# Patient Record
Sex: MALE | Race: WHITE | NOT HISPANIC OR LATINO | Employment: OTHER | URBAN - METROPOLITAN AREA
[De-identification: names, ages, dates, MRNs, and addresses within clinical notes are randomized per-mention and may not be internally consistent; named-entity substitution may affect disease eponyms.]

---

## 2017-01-17 DIAGNOSIS — M25.551 RIGHT HIP PAIN: Primary | ICD-10-CM

## 2017-01-19 ENCOUNTER — TELEPHONE (OUTPATIENT)
Dept: ORTHOPEDICS | Facility: CLINIC | Age: 72
End: 2017-01-19

## 2017-01-19 NOTE — TELEPHONE ENCOUNTER
----- Message from Lexis Sims PA-C sent at 1/17/2017  4:28 PM CST -----  [facility] 2nd floor xray

## 2017-01-19 NOTE — TELEPHONE ENCOUNTER
Patient said he doesn't know when he will be able to get xray, it can be any day within the next 30 days. I asked him if he can give me a date and location I can schedule it and he can go any time. He said he doesn't know, he's headed to [place] right now, he'll have to call back once he picks a day between now and a month from now

## 2017-10-13 ENCOUNTER — HOSPITAL ENCOUNTER (OUTPATIENT)
Dept: RADIOLOGY | Facility: HOSPITAL | Age: 72
Discharge: HOME | End: 2017-10-13
Attending: ORTHOPAEDIC SURGERY
Payer: MEDICARE

## 2017-10-13 DIAGNOSIS — M25.551 RIGHT HIP PAIN: ICD-10-CM

## 2017-10-13 PROCEDURE — 73502 X-RAY EXAM HIP UNI 2-3 VIEWS: CPT | Mod: 26,RT,, | Performed by: RADIOLOGY

## 2017-10-13 PROCEDURE — 73502 X-RAY EXAM HIP UNI 2-3 VIEWS: CPT | Mod: TC,RT

## 2020-09-18 ENCOUNTER — HOSPITAL ENCOUNTER (OUTPATIENT)
Dept: RADIOLOGY | Facility: HOSPITAL | Age: 75
Discharge: HOME | End: 2020-09-18
Attending: INTERNAL MEDICINE
Payer: MEDICARE

## 2020-09-18 DIAGNOSIS — M54.9 BACK PAIN: ICD-10-CM

## 2020-09-18 DIAGNOSIS — M54.9 BACK PAIN: Primary | ICD-10-CM

## 2020-09-18 PROCEDURE — 72110 X-RAY EXAM L-2 SPINE 4/>VWS: CPT | Mod: TC

## 2020-09-18 PROCEDURE — 72050 X-RAY EXAM NECK SPINE 4/5VWS: CPT | Mod: TC

## 2020-09-21 ENCOUNTER — TELEPHONE (OUTPATIENT)
Dept: NEUROSURGERY | Facility: CLINIC | Age: 75
End: 2020-09-21

## 2020-09-21 DIAGNOSIS — M47.816 LUMBAR SPONDYLOSIS: Primary | ICD-10-CM

## 2020-09-21 DIAGNOSIS — M54.2 CERVICAL PAIN (NECK): ICD-10-CM

## 2020-09-21 NOTE — TELEPHONE ENCOUNTER
Spoke with pt. He c/o newly reemerging neck and back problems, has not had any f/u regarding this issue in several years. Appt made with Sheila Loyd PA-C for next week. Pt to get additional XR prior to appt same day. He v/u and is in agreement with plan.

## 2020-09-29 ENCOUNTER — OFFICE VISIT (OUTPATIENT)
Dept: NEUROSURGERY | Facility: CLINIC | Age: 75
End: 2020-09-29
Payer: MEDICARE

## 2020-09-29 ENCOUNTER — TELEPHONE (OUTPATIENT)
Dept: NEUROSURGERY | Facility: CLINIC | Age: 75
End: 2020-09-29

## 2020-09-29 ENCOUNTER — HOSPITAL ENCOUNTER (OUTPATIENT)
Dept: RADIOLOGY | Facility: HOSPITAL | Age: 75
Discharge: HOME | End: 2020-09-29
Attending: PHYSICIAN ASSISTANT
Payer: MEDICARE

## 2020-09-29 VITALS
WEIGHT: 234.38 LBS | HEART RATE: 78 BPM | SYSTOLIC BLOOD PRESSURE: 143 MMHG | HEIGHT: 67 IN | DIASTOLIC BLOOD PRESSURE: 86 MMHG | BODY MASS INDEX: 36.79 KG/M2

## 2020-09-29 DIAGNOSIS — M47.816 LUMBAR SPONDYLOSIS: Primary | ICD-10-CM

## 2020-09-29 DIAGNOSIS — M50.30 DDD (DEGENERATIVE DISC DISEASE), CERVICAL: ICD-10-CM

## 2020-09-29 DIAGNOSIS — R20.0 SADDLE ANESTHESIA: ICD-10-CM

## 2020-09-29 DIAGNOSIS — M54.12 CERVICAL RADICULOPATHY: ICD-10-CM

## 2020-09-29 DIAGNOSIS — Z98.1 S/P LUMBAR FUSION: ICD-10-CM

## 2020-09-29 DIAGNOSIS — Z98.1 HISTORY OF LUMBAR FUSION: Primary | ICD-10-CM

## 2020-09-29 DIAGNOSIS — M54.2 CERVICAL PAIN (NECK): ICD-10-CM

## 2020-09-29 DIAGNOSIS — R20.9 LOSS OF SENSATION OF SADDLE AREA AND BOTH LOWER EXTREMITIES: ICD-10-CM

## 2020-09-29 DIAGNOSIS — Z98.1 HISTORY OF FUSION OF CERVICAL SPINE: ICD-10-CM

## 2020-09-29 DIAGNOSIS — Z98.1 S/P CERVICAL SPINAL FUSION: ICD-10-CM

## 2020-09-29 PROCEDURE — 72040 XR CERVICAL SPINE FLEXION  AND EXTENSION ONLY: ICD-10-PCS | Mod: 26,,, | Performed by: RADIOLOGY

## 2020-09-29 PROCEDURE — 1101F PT FALLS ASSESS-DOCD LE1/YR: CPT | Mod: CPTII,S$GLB,, | Performed by: PHYSICIAN ASSISTANT

## 2020-09-29 PROCEDURE — 99999 PR PBB SHADOW E&M-EST. PATIENT-LVL III: ICD-10-PCS | Mod: PBBFAC,,, | Performed by: PHYSICIAN ASSISTANT

## 2020-09-29 PROCEDURE — 1125F AMNT PAIN NOTED PAIN PRSNT: CPT | Mod: S$GLB,,, | Performed by: PHYSICIAN ASSISTANT

## 2020-09-29 PROCEDURE — 99204 PR OFFICE/OUTPT VISIT, NEW, LEVL IV, 45-59 MIN: ICD-10-PCS | Mod: S$GLB,,, | Performed by: PHYSICIAN ASSISTANT

## 2020-09-29 PROCEDURE — 1101F PR PT FALLS ASSESS DOC 0-1 FALLS W/OUT INJ PAST YR: ICD-10-PCS | Mod: CPTII,S$GLB,, | Performed by: PHYSICIAN ASSISTANT

## 2020-09-29 PROCEDURE — 1125F PR PAIN SEVERITY QUANTIFIED, PAIN PRESENT: ICD-10-PCS | Mod: S$GLB,,, | Performed by: PHYSICIAN ASSISTANT

## 2020-09-29 PROCEDURE — 99999 PR PBB SHADOW E&M-EST. PATIENT-LVL III: CPT | Mod: PBBFAC,,, | Performed by: PHYSICIAN ASSISTANT

## 2020-09-29 PROCEDURE — 99204 OFFICE O/P NEW MOD 45 MIN: CPT | Mod: S$GLB,,, | Performed by: PHYSICIAN ASSISTANT

## 2020-09-29 PROCEDURE — 1159F MED LIST DOCD IN RCRD: CPT | Mod: S$GLB,,, | Performed by: PHYSICIAN ASSISTANT

## 2020-09-29 PROCEDURE — 72040 X-RAY EXAM NECK SPINE 2-3 VW: CPT | Mod: TC

## 2020-09-29 PROCEDURE — 3008F BODY MASS INDEX DOCD: CPT | Mod: CPTII,S$GLB,, | Performed by: PHYSICIAN ASSISTANT

## 2020-09-29 PROCEDURE — 1159F PR MEDICATION LIST DOCUMENTED IN MEDICAL RECORD: ICD-10-PCS | Mod: S$GLB,,, | Performed by: PHYSICIAN ASSISTANT

## 2020-09-29 PROCEDURE — 3008F PR BODY MASS INDEX (BMI) DOCUMENTED: ICD-10-PCS | Mod: CPTII,S$GLB,, | Performed by: PHYSICIAN ASSISTANT

## 2020-09-29 PROCEDURE — 72040 X-RAY EXAM NECK SPINE 2-3 VW: CPT | Mod: 26,,, | Performed by: RADIOLOGY

## 2020-09-29 RX ORDER — HYDROCODONE BITARTRATE AND ACETAMINOPHEN 10; 325 MG/1; MG/1
1 TABLET ORAL
COMMUNITY

## 2020-09-29 RX ORDER — NAPROXEN SODIUM 220 MG/1
81 TABLET, FILM COATED ORAL DAILY
COMMUNITY

## 2020-09-29 NOTE — PROGRESS NOTES
Neurosurgery  History & Physical    SUBJECTIVE:     Chief Complaint: neck and arm pain. Back complications     History of Present Illness:  Patient is a pleasant 75 y/o male who presents to clinic today with multiple complaints. Patient is a previous patient of Dr. Garner's but has not been seen since 2013. He is s/p C4-5 ACDF several years ago and L2-S1 PSIF with Dr. Garner in 2011. He was last seen in 2013 with new complaints of neck pain with radiation into the RUE. CT myelogram showed C5-6 right sided posterior disc-osteophyte complex and EMG showing right chronic C6/C7 radiculopathy. The patient is unclear why he never followed up in our office.     He reports today that he has new groin numbness which started approximately 1 year ago. At first, he reports it was intermittent and he thought it was related to kidney stones he was having at the time, however it is now numb at all times. He reports he is able to urinate on his own and has control over this, but that he feels he cannot fully empty his bladder. He reports full sensation of rectum and reports no issues with bowels. He states he is able to achieve an erection with viagra. He denies loss of sensation otherwise in the extremities. He does also report new pain radiating from lower back into the legs bilaterally, right > left, down the thigh into the calf.     Patient also complains of neck pain that radiates into the BUE, left > right. Pain radiates from shoulder into the biceps and triceps. He reports bilateral hand numbness/tingling as well that is relieved with shaking out his hands. He denies weakness in the upper extremities, gait instability, clumsiness.     Review of patient's allergies indicates:  No Known Allergies    Current Outpatient Medications   Medication Sig Dispense Refill    aspirin 81 MG Chew Take 81 mg by mouth once daily.      gabapentin (NEURONTIN) 600 MG tablet Take 600 mg by mouth 3 (three) times daily.       HYDROcodone-acetaminophen (NORCO)  mg per tablet Take 1 tablet by mouth as needed for Pain.      metoprolol tartrate (LOPRESSOR) 100 MG tablet Take 1 tablet by mouth Daily.      omeprazole (PRILOSEC) 20 MG capsule Take 1 tablet by mouth Daily.      pravastatin (PRAVACHOL) 40 MG tablet Take 1 tablet by mouth Daily.      citalopram (CELEXA) 20 MG tablet Take 1 tablet by mouth Daily.      hydrocodone-acetaminophen (LORTAB)  mg per tablet Take 1 tablet by mouth every 6 (six) hours as needed. (Patient not taking: Reported on 9/29/2020) 120 tablet 0    morphine (MS CONTIN) 15 MG 12 hr tablet Take 1 tablet by mouth Daily.       No current facility-administered medications for this visit.        Past Medical History:   Diagnosis Date    Cerebral aneurysm     GERD (gastroesophageal reflux disease)     Hyperlipidemia     Hypertension      Past Surgical History:   Procedure Laterality Date    CEREBRAL ANEURYSM REPAIR      cervical spinal surgery      CHOLECYSTECTOMY      SPINE SURGERY      x 3 lumbar    TOTAL HIP ARTHROPLASTY      right     Family History     Problem Relation (Age of Onset)    Cancer Brother (45), Sister (60)    Lung cancer Father        Social History     Socioeconomic History    Marital status:      Spouse name: Not on file    Number of children: Not on file    Years of education: Not on file    Highest education level: Not on file   Occupational History    Not on file   Social Needs    Financial resource strain: Not on file    Food insecurity     Worry: Not on file     Inability: Not on file    Transportation needs     Medical: Not on file     Non-medical: Not on file   Tobacco Use    Smoking status: Former Smoker   Substance and Sexual Activity    Alcohol use: No    Drug use: No    Sexual activity: Not on file   Lifestyle    Physical activity     Days per week: Not on file     Minutes per session: Not on file    Stress: Not on file   Relationships    Social  "connections     Talks on phone: Not on file     Gets together: Not on file     Attends [employer] service: Not on file     Active member of club or organization: Not on file     Attends meetings of clubs or organizations: Not on file     Relationship status: Not on file   Other Topics Concern    Not on file   Social History Narrative    Not on file       Review of Systems   Constitutional: Positive for unexpected weight change (weight gain). Negative for fever.   HENT: Positive for tinnitus. Negative for hearing loss, nosebleeds, sore throat, trouble swallowing and voice change.    Eyes: Negative for photophobia and visual disturbance.   Respiratory: Negative for cough, shortness of breath and wheezing.    Cardiovascular: Negative for chest pain and palpitations.   Gastrointestinal: Positive for abdominal pain, constipation, nausea and vomiting. Negative for abdominal distention, blood in stool, diarrhea and rectal pain.   Genitourinary: Positive for difficulty urinating and dysuria. Negative for hematuria.        Difficulty starting or ending a stream  Loss of genital sensation   Able to obtain erection with viagra    Musculoskeletal: Positive for back pain and neck pain. Negative for gait problem.   Neurological: Positive for dizziness, numbness and headaches. Negative for weakness.   Hematological: Bruises/bleeds easily.   Psychiatric/Behavioral: Positive for hallucinations. The patient is nervous/anxious.        OBJECTIVE:     Vital Signs  Pulse: 78  BP: (!) 143/86  Pain Score:   3  Height: 5' 7" (170.2 cm)  Weight: 106.3 kg (234 lb 5.6 oz)  Body mass index is 36.7 kg/m².      Neurosurgery Physical Exam  General: well developed, well nourished, no distress.   Head: normocephalic, atraumatic  Neurologic: Alert and oriented. Thought content appropriate.  GCS: Motor: 6/Verbal: 5/Eyes: 4 GCS Total: 15  Mental Status: Awake, Alert, Oriented x 4  Language: No aphasia  Cranial nerves: face symmetric, tongue midline, " CN II-XII grossly intact.   Eyes: pupils equal, round, reactive to light with accommodation, EOMI.   Pulmonary: normal respirations, no signs of respiratory distress  Skin: Skin is warm, dry and intact.  Sensory: intact to light touch throughout  Motor Strength:Moves all extremities spontaneously with good tone.  Full strength upper and lower extremities. No abnormal movements seen.     Strength  Deltoids Triceps Biceps Wrist Extension Wrist Flexion Hand    Upper: R 5/5 5/5 5/5 5/5 5/5 5/5    L 5/5 5/5 5/5 5/5 5/5 5/5     Iliopsoas Quadriceps Knee  Flexion Tibialis  anterior Gastro- cnemius EHL   Lower: R 5/5 5/5 5/5 5/5 5/5 5/5    L 5/5 5/5 5/5 5/5 5/5 5/5     Reflexes:   DTR: 2+ symmetrically throughout.  Liu's: positive on the left   Clonus: Negative.    Gait stable, fluid.   Tandem Gait: No difficulty  Able to walk on heels & toes     Cervical:   ROM: Full with flexion, extension, lateral rotation and ear-to-shoulder bend.   Midline TTP: Negative.  Lhermitte's: Negative.     Thoracic:  Midline TTP: Negative.     Lumbar:  Midline TTP: Negative.    Other:  SI joint TTP: Negative.  Greater trochanter TTP: Negative.      Diagnostic Results:  I independently reviewed the imaging.     Cervical myelogram 2013 reviewed showing stable fusion at C4-5 with right paracentral posterior disc complex at C5-6    EMG 2013 reviewed showing chronic right C6/7 radiculopathy    XR CERVICAL SPINE FLEXION  AND EXTENSION ONLY  CLINICAL HISTORY:  Cervicalgia  TECHNIQUE:  Flexion and extension views of the cervical spine  COMPARISON:  Flexion and extension views of the cervical spine are noted.  Overall appearance is similar to recent examination with an old fusion of the C4-C5 disc space.  Degenerative changes are evident.  FINDINGS:  See above  Electronically signed by: Ever Hadley MD  Date:                                            09/29/2020  Time:                                           10:42    ASSESSMENT/PLAN:    73 y/o male with history of C4-C5 ACDF and L2-S1 fusion who presents to clinic today for evaluation of BUE numbness/pain/tingling (L>R), groin numbness, and BLE pain (R>L). Patient reports he is now able to have MRI scans.     -Previous (2013) cervical myelogram showing C5-C6 right sided posterior disc complex and EMG showing C6/7 radiculopathy. Patient reports pain has changed since his last visit and now pain radiates from bilateral shoulders to biceps, triceps (L>R), and now has bilateral (L>R) hand numbness/tingling. Patient strong but does have positive jordan's on the left. Recommend MRI cervical spine without contrast to further evaluate.     -Patient now with new onset groin numbness for approximately 1 year and also notes he is not able to fully empty his bladder. Has been worked up for BPH and is on flomax with no improvement. Reports no other sensation abnormality. Also reports new onset BLE (R>L) pain that radiates from lower back down to the calf. Recommend thoracic and lumbar spine MRI without contrast to further evaluate this.     Recommend imaging to be obtained this week given the patient's symptoms. He is in agreement. We will follow up with the patient once images have been obtained to discuss further treatment plan. Likely, this will need to be with Dr. Garner.     I have encouraged him to contact the clinic with any questions, concerns, or adverse clinical changes. He verbalized understanding.     I spent >45 minutes reviewing patient records, examining, and counseling the patient with greater than 50% of the time spent with direct patient care, counseling and coordination.

## 2020-09-29 NOTE — LETTER
September 29, 2020      Jose Hoyt MD  [address]  [address]  [facility] [address]           [facility] [address]  [address]  [facility] [address]  Phone: [phone]  Fax: [phone]          Patient: Michi Agrawal   MR Number: [id number]   YOB: 1945   Date of Visit: 9/29/2020       Dear Dr. Jose Hoyt:    Thank you for referring Michi Agrawal to me for evaluation. Attached you will find relevant portions of my assessment and plan of care.    If you have questions, please do not hesitate to call me. I look forward to following Michi Agrawal along with you.    Sincerely,    TAWNYA Shields  CC:  No Recipients    If you would like to receive this communication electronically, please contact [email or web address] or [phone] to request more information on [employer] access.    For providers and/or their staff who would like to refer a patient to [facility], please contact us through our one-stop-shop provider referral line, [facility], at [phone].    If you feel you have received this communication in error or would no longer like to receive these types of communications, please e-mail [email or web address]

## 2020-10-06 ENCOUNTER — HOSPITAL ENCOUNTER (OUTPATIENT)
Dept: RADIOLOGY | Facility: HOSPITAL | Age: 75
Discharge: HOME | End: 2020-10-06
Attending: PHYSICIAN ASSISTANT
Payer: MEDICARE

## 2020-10-06 DIAGNOSIS — R20.9 LOSS OF SENSATION OF SADDLE AREA AND BOTH LOWER EXTREMITIES: ICD-10-CM

## 2020-10-06 DIAGNOSIS — M54.2 CERVICAL PAIN (NECK): ICD-10-CM

## 2020-10-06 DIAGNOSIS — Z98.1 S/P LUMBAR FUSION: ICD-10-CM

## 2020-10-06 DIAGNOSIS — M50.30 DDD (DEGENERATIVE DISC DISEASE), CERVICAL: ICD-10-CM

## 2020-10-06 DIAGNOSIS — Z98.1 S/P CERVICAL SPINAL FUSION: ICD-10-CM

## 2020-10-06 DIAGNOSIS — M47.816 LUMBAR SPONDYLOSIS: ICD-10-CM

## 2020-10-06 PROCEDURE — 72148 MRI LUMBAR SPINE W/O DYE: CPT | Mod: TC

## 2020-10-06 PROCEDURE — 72146 MRI CHEST SPINE W/O DYE: CPT | Mod: TC

## 2020-10-06 PROCEDURE — 72141 MRI NECK SPINE W/O DYE: CPT | Mod: TC

## 2020-10-07 ENCOUNTER — OFFICE VISIT (OUTPATIENT)
Dept: NEUROSURGERY | Facility: CLINIC | Age: 75
End: 2020-10-07
Payer: MEDICARE

## 2020-10-07 VITALS
DIASTOLIC BLOOD PRESSURE: 87 MMHG | TEMPERATURE: 98 F | WEIGHT: 230.38 LBS | SYSTOLIC BLOOD PRESSURE: 149 MMHG | HEART RATE: 71 BPM | BODY MASS INDEX: 36.08 KG/M2

## 2020-10-07 DIAGNOSIS — Z98.1 S/P LUMBAR FUSION: ICD-10-CM

## 2020-10-07 DIAGNOSIS — M54.2 CERVICAL PAIN (NECK): ICD-10-CM

## 2020-10-07 DIAGNOSIS — Z98.1 S/P CERVICAL SPINAL FUSION: ICD-10-CM

## 2020-10-07 DIAGNOSIS — M47.816 LUMBAR SPONDYLOSIS: Primary | ICD-10-CM

## 2020-10-07 PROCEDURE — 1125F PR PAIN SEVERITY QUANTIFIED, PAIN PRESENT: ICD-10-PCS | Mod: S$GLB,,, | Performed by: NEUROLOGICAL SURGERY

## 2020-10-07 PROCEDURE — 1125F AMNT PAIN NOTED PAIN PRSNT: CPT | Mod: S$GLB,,, | Performed by: NEUROLOGICAL SURGERY

## 2020-10-07 PROCEDURE — 1101F PR PT FALLS ASSESS DOC 0-1 FALLS W/OUT INJ PAST YR: ICD-10-PCS | Mod: CPTII,S$GLB,, | Performed by: NEUROLOGICAL SURGERY

## 2020-10-07 PROCEDURE — 1159F PR MEDICATION LIST DOCUMENTED IN MEDICAL RECORD: ICD-10-PCS | Mod: S$GLB,,, | Performed by: NEUROLOGICAL SURGERY

## 2020-10-07 PROCEDURE — 99999 PR PBB SHADOW E&M-EST. PATIENT-LVL IV: CPT | Mod: PBBFAC,,, | Performed by: NEUROLOGICAL SURGERY

## 2020-10-07 PROCEDURE — 3008F BODY MASS INDEX DOCD: CPT | Mod: CPTII,S$GLB,, | Performed by: NEUROLOGICAL SURGERY

## 2020-10-07 PROCEDURE — 99999 PR PBB SHADOW E&M-EST. PATIENT-LVL IV: ICD-10-PCS | Mod: PBBFAC,,, | Performed by: NEUROLOGICAL SURGERY

## 2020-10-07 PROCEDURE — 99202 OFFICE O/P NEW SF 15 MIN: CPT | Mod: S$GLB,,, | Performed by: NEUROLOGICAL SURGERY

## 2020-10-07 PROCEDURE — 99202 PR OFFICE/OUTPT VISIT, NEW, LEVL II, 15-29 MIN: ICD-10-PCS | Mod: S$GLB,,, | Performed by: NEUROLOGICAL SURGERY

## 2020-10-07 PROCEDURE — 3008F PR BODY MASS INDEX (BMI) DOCUMENTED: ICD-10-PCS | Mod: CPTII,S$GLB,, | Performed by: NEUROLOGICAL SURGERY

## 2020-10-07 PROCEDURE — 1101F PT FALLS ASSESS-DOCD LE1/YR: CPT | Mod: CPTII,S$GLB,, | Performed by: NEUROLOGICAL SURGERY

## 2020-10-07 PROCEDURE — 1159F MED LIST DOCD IN RCRD: CPT | Mod: S$GLB,,, | Performed by: NEUROLOGICAL SURGERY

## 2020-10-07 NOTE — PROGRESS NOTES
Subjective:   I, Bharati Mckeon, attest that this documentation has been prepared under the direction and in the presence of Alirio Garner MD.     Patient ID: Michi Agrawal is a 74 y.o. male     Chief Complaint: No chief complaint on file.          HPI  Mr. Michi Agrawal is a pleasant 74 y.o. gentleman who presents today for follow up with MRI C/T/L spine. Pt was previously under my care for back pain and was last seen by me on 12/11/2013 for new onset neck pain with radiation into the RUE. He is s/p C4-5 ACDF several years ago and L2-S1 PSIF with me in 2011. He had a CT myelogram at that time that showed C5-6 right sided posterior disc-osteophyte complex and EMG showing right chronic C6/C7 radiculopathy. He recently returned and was seen by Sheila Loyd on 09/29/2020 for groin numbness, neck pain, and new low back pain. In regards to the groin numbness, he stated it started approximately 1 year ago, was initially intermittent  in nature and thought to be related to the kidney stones he had at the time, however the numbness is now constant. He is able to urinate on his own with control, but feels he cannot fully empty his bladder. He reported full sensation of rectum and denies issues with bowels.  He is able to achieve an erection with viagra. He denied loss of sensation otherwise in the extremities. Pt also reported new pain radiating from lower back into the legs bilaterally, right > left, down the thigh into the calf.  Pt also complained of neck pain that radiates into the BUE, left > right, from the shoulder into the biceps and triceps.     Pt states pain is the mid lower back, emerges after prolonged sitting (driving in the car), and he also endorses occasional numbness in his leg and foot. He denies leg weakness. He has followed up with Urology and attempted Flomax without improvement.  Pt also complains of difficulty lifting his arms above his head secondary to numbness/tingling that usually occurs at  night.         Review of Systems   Constitutional: Negative for activity change, appetite change, fatigue, fever and unexpected weight change.   HENT: Negative for facial swelling.    Eyes: Negative.    Respiratory: Negative.    Cardiovascular: Negative.    Gastrointestinal: Negative for diarrhea, nausea and vomiting.   Endocrine: Negative.    Genitourinary: Negative.    Musculoskeletal: Positive for back pain (into the BLE) and neck pain. Negative for joint swelling and myalgias.        +right groin and leg pain    Neurological: Positive for numbness (in arms and legs). Negative for dizziness, seizures, weakness and headaches.   Psychiatric/Behavioral: Negative.       Past Medical History:   Diagnosis Date    Cerebral aneurysm     GERD (gastroesophageal reflux disease)     Hyperlipidemia     Hypertension        Objective:      Vitals:    10/07/20 1209   BP: (!) 149/87   Pulse: 71   Temp: 98.2 °F (36.8 °C)      Physical Exam  HENT:      Head: Normocephalic and atraumatic.   Neck:      Musculoskeletal: Neck supple.   Pulmonary:      Breath sounds: Normal breath sounds.   Musculoskeletal:      Comments: No tenderness about the SI joint.   Neurological:      Mental Status: He is alert and oriented to person, place, and time.      GCS: GCS eye subscore is 4. GCS verbal subscore is 5. GCS motor subscore is 6.      Cranial Nerves: No cranial nerve deficit.      Comments: Strength intact with seated hip flexion and leg extension bilaterally.           IMAGING:  MRI Lumbar Spine Without Contrast (10/06/2020) shows a disc bulge with stenosis at L1-2.  There are expected postoperative changes from prior posterior fusion at L2-S1 with solid bone growth throughout.     EMG 01/09/2014  This study is abnormal. There is electrophysiologic evidence for:   1. A mild, right median neuropathy at the wrist (carpal tunnel syndrome);   2. A chronic, right C6 or C7 radiculopathy. No evidence for active denervation was observed.          I have personally reviewed the images with the pt.      I, Dr. Alirio Garner, personally performed the services described in this documentation. All medical record entries made by the scribe, Bharati Mckeon, were at my direction and in my presence.  I have reviewed the chart and agree that the record reflects my personal performance and is accurate and complete. Alirio Garner MD. 10/07/2020    Assessment:       1. Lumbar spondylosis    2. Cervical pain (neck)    3. S/P lumbar fusion    4. S/P cervical spinal fusion         Plan:   I have personally reviewed the MRI lumbar spine with the pt which shows a disc bulge with stenosis at L1-2. There are expected postoperative changes from prior posterior fusion at L2-S1 with solid bone growth throughout. MRI of cervical spine shows a small disc bulge at C5-6 with a lateral component.    Will order CT of the lumbar spine and EMG/NCS of the bilateral upper and lower extremities.  I will schedule the patient for a follow up visit to review results.

## 2020-10-07 NOTE — PATIENT INSTRUCTIONS
I have personally reviewed the MRI lumbar spine with the pt which shows a disc bulge with stenosis at L1-2. There are expected postoperative changes from prior posterior fusion at L2-S1 with solid bone growth throughout. MRI of cervical spine shows a small disc bulge at C5-6 with a lateral component.    Will order CT of the lumbar spine and EMG/NCS of the bilateral upper and lower extremities.  I will schedule the patient for a follow up visit to review results.

## 2020-10-15 ENCOUNTER — TELEPHONE (OUTPATIENT)
Dept: NEUROSURGERY | Facility: CLINIC | Age: 75
End: 2020-10-15

## 2020-10-27 ENCOUNTER — PROCEDURE VISIT (OUTPATIENT)
Dept: PHYSICAL MEDICINE AND REHAB | Facility: CLINIC | Age: 75
End: 2020-10-27
Payer: MEDICARE

## 2020-10-27 DIAGNOSIS — Z98.1 S/P CERVICAL SPINAL FUSION: ICD-10-CM

## 2020-10-27 DIAGNOSIS — M54.2 CERVICAL PAIN (NECK): ICD-10-CM

## 2020-10-27 DIAGNOSIS — M47.816 LUMBAR SPONDYLOSIS: ICD-10-CM

## 2020-10-27 DIAGNOSIS — Z98.1 S/P LUMBAR FUSION: ICD-10-CM

## 2020-10-27 PROCEDURE — 95913 PR NERVE CONDUCTION STUDY; 13 OR MORE STUDIES: ICD-10-PCS | Mod: S$GLB,,, | Performed by: PHYSICAL MEDICINE & REHABILITATION

## 2020-10-27 PROCEDURE — 95913 NRV CNDJ TEST 13/> STUDIES: CPT | Mod: S$GLB,,, | Performed by: PHYSICAL MEDICINE & REHABILITATION

## 2020-10-27 PROCEDURE — 95886 PR EMG COMPLETE, W/ NERVE CONDUCTION STUDIES, 5+ MUSCLES: ICD-10-PCS | Mod: S$GLB,,, | Performed by: PHYSICAL MEDICINE & REHABILITATION

## 2020-10-27 PROCEDURE — 95886 MUSC TEST DONE W/N TEST COMP: CPT | Mod: S$GLB,,, | Performed by: PHYSICAL MEDICINE & REHABILITATION

## 2020-10-27 NOTE — PROCEDURES
Test Date:  10/27/2020    Patient: Michi Agrawal : 1945 Physician: Ashok Landrum D.O.   ID#:  SEX: Male Ref. Phys: Alirio Garner MD     HPI: Michi Agrawal is a 74 y.o.male who presents for NCS/EMG to evaluate bilateral lumbosacral and cervical radiculopathy.  Hx of lumbar decompression X3.      NCV & EMG Findings:   Evaluation of the left median motor and the right median motor nerves showed prolonged distal onset latency.   The left median sensory nerve showed no response.   The right median sensory nerve showed prolonged distal peak latency, reduced amplitude, and decreased conduction velocity.   The left ulnar sensory nerve showed decreased conduction velocity.   All remaining nerves (as indicated in the following tables) were within normal limits.   Needle evaluation of the right Tibialis Posterior muscle showed increased insertional activity, moderately increased spontaneous activity, increased motor unit amplitude, and diminished recruitment.   The right Gastrocnemius, the right Biceps Femoris (Short Hd), and the right Gluteus Ridge muscles showed increased insertional activity and slightly increased spontaneous activity.   The left Tibialis Posterior, the left Gastrocnemius, the left Flexor Digitorum Longus, the right Flexor Digitorum Longus, the left Gluteus Ridge, the right Pronator Teres, the right Cervical Paraspinals (Lower), the left deltoid, the left Pronator Teres, the left Ext Digitorum Communis, and the right Brachioradialis muscles showed increased insertional activity and moderately increased spontaneous activity.   The left Cervical Paraspinals (Lower) muscle showed increased insertional activity and widespread spontaneous activity.   The left Brachioradialis muscle showed increased insertional activity.   The left Abductor Pollicis Brevis muscle showed increased insertional activity, moderately increased spontaneous activity, increased motor unit amplitude,  increased motor unit duration, and diminished recruitment.   All remaining muscles (as indicated in the following table) showed no evidence of electrical instability.    Impression:  1. There is evidence of acute BILATERAL S1 radiculopathies with active/ongoing denervation  2. There is evidence of acute BILATERAL C6 and possibly LEFT C7 radiculopathies with active/ongoing denervation.    3. There is electrophysiologic evidence of a bilateral sensorimotor median mononeuropathy across the wrist (I.e. Carpal tunnel syndrome).  There is no motor axonal loss.  There is active denervation on the left, not on the right.  This is graded as Moderate in severity on the right, and Severe on the Left.        ___________________________  Ashok Landrum D.O.        NCS+  Motor Nerve Results      Latency Amplitude F-Lat Segment Distance CV Comment   Site (ms) Norm (mV) Norm (ms)  (cm) (m/s) Norm    Left Median (APB)   Wrist *6.2  < 4.7 7.5  > 3.8  Wrist-Palm - - -    Elbow 11.3 - 7.6 -  Elbow-Wrist 25 49  > 47    Right Median (APB)   Wrist *5.7  < 4.7 6.8  > 3.8  Wrist-Palm - - -    Elbow 10.4 - 6.7 -  Elbow-Wrist 25 53  > 47    Left Ulnar (ADM)   Wrist 3.5  < 3.7 8.5  > 3.0         Bel Elbow 7.2 - 6.5 -  Bel Elbow-Wrist 25 68  > 52    Abv Elbow 9.8 - 7.4 -  Abv Elbow-Bel Elbow 15 58  > 43    Right Ulnar (ADM)   Wrist 3.4  < 3.7 7.2  > 3.0         Bel Elbow 7.2 - 6.8 -  Bel Elbow-Wrist 25 66  > 52    Abv Elbow 9.6 - 6.0 -  Abv Elbow-Bel Elbow 15 63  > 43    Left Fibular (EDB)   Ankle 4.5  < 6.5 7.6  > 1.10         Bel Fib Head 12.0 - 6.7 -  Bel Fib Head-Ankle 34 45  > 39    Pop Fossa 14.6 - 7.8 -  Pop Fossa-Bel Fib Head 16 62  > 42    Right Fibular (EDB)   Ankle 5.0  < 6.5 5.0  > 1.10         Bel Fib Head 12.4 - 4.1 -  Bel Fib Head-Ankle 34 46  > 39    Pop Fossa 14.9 - 4.0 -  Pop Fossa-Bel Fib Head 14 56  > 42    Left Tibial (AHB)   Ankle 4.0  < 6.1 5.9  > 1.10         Right Tibial (AHB)   Ankle 3.9  < 6.1 5.1  > 1.10            Sensory Nerve Results      Latency (Peak) Amplitude (P-P) Segment Distance CV Comment   Site (ms) Norm (µV) Norm  (cm) (m/s) Norm    Left Median   Wrist-Dig II *NR  < 4.0 *NR  > 8 Wrist-Dig II 14 *NR  > 39    Right Median   Wrist-Dig II *5.9  < 4.0 *8  > 8 Wrist-Dig II 14 *24  > 39    Left Ulnar   Wrist-Dig V 4.0  < 4.0 42  > 4 Wrist-Dig V 14 *35  > 38    Right Ulnar   Wrist-Dig V 3.5  < 4.0 41  > 4 Wrist-Dig V 14 40  > 38    Left Sural   Calf-Lat Mall 3.3  < 4.5 10  > 4 Calf-Lat Mall 14 42  > 35    Right Sural   Calf-Lat Mall 3.6  < 4.5 6  > 4 Calf-Lat Mall 14 39  > 35      EMG+     Side Muscle Nerve Root Ins Act Fibs Psw Amp Dur Poly Recrt Int Pat Comment   Right Vastus Med Femoral L2-L4 Nml Nml Nml Nml Nml 0 Nml Nml    Right Lumbo Parasp (Mid) Rami L3-L4 Nml Nml Nml         Right Tib Anterior Fibular,  Deep Fibula... L4-L5 Nml Nml Nml Nml Nml 0 Nml Nml    Right Tib Posterior Tibial L5-S1 *Incr *2+ *2+ *Incr Nml 0 *Reduced Nml    Right Gastroc Tibial S1-S2 *Incr *1+ *1+ Nml Nml 0 Nml Nml    Right Biceps Fem SH Sciatic L5-S1 *Incr *1+ *1+ Nml Nml 0 Nml Nml    Right Lumbo Parasp (Lower) Rami L5-S1 Nml Nml Nml         Left Vastus Med Femoral L2-L4 Nml Nml Nml Nml Nml 0 Nml Nml    Left Tib Anterior Fibular,  Deep Fibula... L4-L5 Nml Nml Nml Nml Nml 0 Nml Nml    Left Tib Posterior Tibial L5-S1 *Incr *2+ *2+ Nml Nml 0 Nml Nml    Left Gastroc Tibial S1-S2 *Incr *2+ *2+ Nml Nml 0 Nml Nml myotonic discharge   Left Biceps Fem SH Sciatic L5-S1 Nml Nml Nml Nml Nml 0 Nml Nml    Left Lumbo Parasp (Mid) Rami L3-L4 Nml Nml Nml         Left Lumbo Parasp (Lower) Rami L5-S1 Nml Nml Nml         Left FDL Tibial L5-S2 *Incr *2+ *2+ Nml Nml 0 Nml Nml    Right FDL Tibial L5-S2 *Incr *2+ *2+ Nml Nml 0 Nml Nml    Right Gluteus Max Inf Gluteal L5-S2 *Incr Nml *1+ Nml Nml 0 Nml Nml    Right Gluteus Med Sup Gluteal L5-S1 Nml Nml Nml Nml Nml 0 Nml Nml    Left Gluteus Max Inf Gluteal L5-S2 *Incr *2+ *2+ Nml Nml 0 Nml Nml    Right Deltoid  Axillary C5-C6 Nml Nml Nml Nml Nml 0 Nml Nml    Right Triceps Radial C6-C8 Nml Nml Nml Nml Nml 0 Nml Nml    Right Pronator Teres Median C6-C7 *Incr *2+ *2+ Nml Nml 0 Nml Nml    Right EDC Radial,  Posterior In... C7-C8 Nml Nml Nml Nml Nml 0 Nml Nml    Right FDI Ulnar C8-T1 Nml Nml Nml Nml Nml 0 Nml Nml    Right Cervical Parasp (Mid) Rami C4-C6 Nml Nml Nml         Right Cervical Parasp (Lower) Rami C7-C8 *Incr *2+ *2+         Left Deltoid Axillary C5-C6 *Incr *2+ *2+ Nml Nml 0 Nml Nml    Left Triceps Radial C6-C8 Nml Nml Nml Nml Nml 0 Nml Nml    Left Pronator Teres Median C6-C7 *Incr *2+ *2+ Nml Nml 0 Nml Nml    Left EDC Radial,  Posterior In... C7-C8 *Incr *2+ *2+ Nml Nml 0 Nml Nml    Left FDI Ulnar C8-T1 Nml Nml Nml Nml Nml 0 Nml Nml    Left Cervical Parasp (Mid) Rami C4-C6 Nml Nml Nml         Left Cervical Parasp (Lower) Rami C7-C8 *Incr *4+ *4+         Right Brachiorad Radial C5-C6 *Incr *2+ *2+ Nml Nml 0 Nml Nml    Right Biceps Musculocut C5-C6 Nml Nml Nml Nml Nml 0 Nml Nml    Right APB Median C8-T1 Nml Nml Nml Nml Nml 0 Nml Nml    Left Brachiorad Radial C5-C6 *Incr Nml Nml Nml Nml 0 Nml Nml doublet   Left APB Median C8-T1 *Incr *2+ *2+ *Incr *>12ms 0 *Reduced Nml            Waveforms:    Motor                         Sensory

## 2020-10-28 ENCOUNTER — HOSPITAL ENCOUNTER (OUTPATIENT)
Dept: RADIOLOGY | Facility: HOSPITAL | Age: 75
Discharge: HOME | End: 2020-10-28
Attending: NEUROLOGICAL SURGERY
Payer: MEDICARE

## 2020-10-28 ENCOUNTER — TELEPHONE (OUTPATIENT)
Dept: NEUROSURGERY | Facility: CLINIC | Age: 75
End: 2020-10-28

## 2020-10-28 ENCOUNTER — OFFICE VISIT (OUTPATIENT)
Dept: NEUROSURGERY | Facility: CLINIC | Age: 75
End: 2020-10-28
Payer: MEDICARE

## 2020-10-28 VITALS
WEIGHT: 222.69 LBS | BODY MASS INDEX: 34.87 KG/M2 | DIASTOLIC BLOOD PRESSURE: 72 MMHG | SYSTOLIC BLOOD PRESSURE: 136 MMHG | HEART RATE: 48 BPM

## 2020-10-28 DIAGNOSIS — M47.816 LUMBAR SPONDYLOSIS: ICD-10-CM

## 2020-10-28 DIAGNOSIS — Z98.1 S/P LUMBAR FUSION: ICD-10-CM

## 2020-10-28 DIAGNOSIS — Z98.1 HISTORY OF LUMBAR FUSION: ICD-10-CM

## 2020-10-28 DIAGNOSIS — M47.816 LUMBAR SPONDYLOSIS: Primary | ICD-10-CM

## 2020-10-28 DIAGNOSIS — M54.2 CERVICAL PAIN (NECK): ICD-10-CM

## 2020-10-28 DIAGNOSIS — M47.22 CERVICAL SPONDYLOSIS WITH RADICULOPATHY: ICD-10-CM

## 2020-10-28 DIAGNOSIS — R20.0 SADDLE ANESTHESIA: ICD-10-CM

## 2020-10-28 PROCEDURE — 99999 PR PBB SHADOW E&M-EST. PATIENT-LVL IV: CPT | Mod: PBBFAC,,, | Performed by: NEUROLOGICAL SURGERY

## 2020-10-28 PROCEDURE — 99214 PR OFFICE/OUTPT VISIT, EST, LEVL IV, 30-39 MIN: ICD-10-PCS | Mod: S$GLB,,, | Performed by: NEUROLOGICAL SURGERY

## 2020-10-28 PROCEDURE — 1159F PR MEDICATION LIST DOCUMENTED IN MEDICAL RECORD: ICD-10-PCS | Mod: S$GLB,,, | Performed by: NEUROLOGICAL SURGERY

## 2020-10-28 PROCEDURE — 3008F BODY MASS INDEX DOCD: CPT | Mod: CPTII,S$GLB,, | Performed by: NEUROLOGICAL SURGERY

## 2020-10-28 PROCEDURE — 1101F PT FALLS ASSESS-DOCD LE1/YR: CPT | Mod: CPTII,S$GLB,, | Performed by: NEUROLOGICAL SURGERY

## 2020-10-28 PROCEDURE — 1159F MED LIST DOCD IN RCRD: CPT | Mod: S$GLB,,, | Performed by: NEUROLOGICAL SURGERY

## 2020-10-28 PROCEDURE — 1125F AMNT PAIN NOTED PAIN PRSNT: CPT | Mod: S$GLB,,, | Performed by: NEUROLOGICAL SURGERY

## 2020-10-28 PROCEDURE — 99999 PR PBB SHADOW E&M-EST. PATIENT-LVL IV: ICD-10-PCS | Mod: PBBFAC,,, | Performed by: NEUROLOGICAL SURGERY

## 2020-10-28 PROCEDURE — 3008F PR BODY MASS INDEX (BMI) DOCUMENTED: ICD-10-PCS | Mod: CPTII,S$GLB,, | Performed by: NEUROLOGICAL SURGERY

## 2020-10-28 PROCEDURE — 1101F PR PT FALLS ASSESS DOC 0-1 FALLS W/OUT INJ PAST YR: ICD-10-PCS | Mod: CPTII,S$GLB,, | Performed by: NEUROLOGICAL SURGERY

## 2020-10-28 PROCEDURE — 99214 OFFICE O/P EST MOD 30 MIN: CPT | Mod: S$GLB,,, | Performed by: NEUROLOGICAL SURGERY

## 2020-10-28 PROCEDURE — 72131 CT LUMBAR SPINE WITHOUT CONTRAST: ICD-10-PCS | Mod: 26,,, | Performed by: RADIOLOGY

## 2020-10-28 PROCEDURE — 72131 CT LUMBAR SPINE W/O DYE: CPT | Mod: 26,,, | Performed by: RADIOLOGY

## 2020-10-28 PROCEDURE — 72131 CT LUMBAR SPINE W/O DYE: CPT | Mod: TC

## 2020-10-28 PROCEDURE — 1125F PR PAIN SEVERITY QUANTIFIED, PAIN PRESENT: ICD-10-PCS | Mod: S$GLB,,, | Performed by: NEUROLOGICAL SURGERY

## 2020-10-28 NOTE — PROGRESS NOTES
Subjective:   I, Vilma Keating, attest that this documentation has been prepared under the direction and in the presence of Alirio Garner MD.     Patient ID: Michi Agrawal is a 74 y.o. male     Chief Complaint: No chief complaint on file.          HPI  Mr. Michi Agrawal is a pleasant 74 y.o. gentleman with a  PMHx of cerebral aneurysm, HLD, and HTN who presents to the clinic for follow-up with new imaging. He was last seen by me on 10/07/2020 for mid lower back, that emerged after prolonged sitting (driving in the car), and he also endorsed occasional numbness in his leg and foot. He denied leg weakness.Pt also complained of difficulty lifting his arms above his head secondary to numbness/tingling that usually occurs at night. He previousley had neck pain with radiation into the RUE. He is s/p C4-5 ACDF several years ago and L2-S1 PSIF with me in 2011. He had a CT myelogram at that time that showed C5-6 right sided posterior disc-osteophyte complex and EMG showing right chronic C6/C7 radiculopathy. He recently returned and was seen by Sheila Loyd on 09/29/2020 for groin numbness, neck pain, and new low back pain.    His primary complaint is the radiating leg pain. He began taking Gabapentin again roughly 8 months ago. He denies pain looking up or down. He typically finds raising his arms alleviates his neck pain. He has had injections in the past and found them to be ineffective. He is concerned about his bladder symptoms. He has found Flomax to be ineffective.      Review of Systems   Constitutional: Negative for activity change, appetite change, fatigue, fever and unexpected weight change.   HENT: Negative for facial swelling.    Eyes: Negative.    Respiratory: Negative.    Cardiovascular: Negative.    Gastrointestinal: Negative for diarrhea, nausea and vomiting.   Endocrine: Negative.    Genitourinary: Negative.    Musculoskeletal: Positive for myalgias and neck pain. Negative for back pain and joint  swelling.   Neurological: Positive for numbness. Negative for dizziness, seizures, weakness and headaches.   Psychiatric/Behavioral: Negative.       Past Medical History:   Diagnosis Date    Cerebral aneurysm     GERD (gastroesophageal reflux disease)     Hyperlipidemia     Hypertension        Objective:    There were no vitals filed for this visit.   Physical Exam       IMAGING:    EMG (10/27/2020):    1. There is evidence of acute BILATERAL S1 radiculopathies with   active/ongoing denervation   2. There is evidence of acute BILATERAL C6 and possibly LEFT C7   radiculopathies with active/ongoing denervation.     3. There is electrophysiologic evidence of a bilateral   sensorimotor median mononeuropathy across the wrist (I.e. Carpal   tunnel syndrome).  There is no motor axonal loss.  There is   active denervation on the left, not on the right.  This is graded   as Moderate in severity on the right, and Severe on the Left.       CT L-Spine (10/28/2020):  1.Shows solid bony fusion from L2-S1  2. S1 radiculopathies  3. Stenosis at S1 level    MRI C-Spine (10/6/2020):  1. Foraminal stenosis on the left at C6-7    I have personally reviewed the images with the pt.      I, Dr. Alirio Garner, personally performed the services described in this documentation. All medical record entries made by the scribe, Vilma Keating, were at my direction and in my presence.  I have reviewed the chart and agree that the record reflects my personal performance and is accurate and complete. Alirio Garner MD. 10/28/2020    Assessment:       1. Lumbosacral radiculopathy.  2. Bilateral C6 radiculopathy.  3. S/p lumbar fusion.  Plan:   I have personally reviewed the EMG, MRI C-Spine, and CT L-Spine with the pt which shows bilateral  S1 radiculopathies. Additionally the EMG showed evidence of bilateral C6 radiculopathies and carpal tunnel syndrome.    I recommend a transforaminal injection at S1.    I will schedule the patient with pain  management, Flex-ex Xray C-spine and L-Spine, and a follow-up in 3 months.

## 2020-10-28 NOTE — PATIENT INSTRUCTIONS
I have personally reviewed the EMG, MRI C-Spine, and CT L-Spine with the pt which shows bilateral  S1 radiculopathies. Additionally the EMG showed evidence of bilateral C6 radiculopathies and carpal tunnel syndrome.    I recommend a transforaminal injection at S1.    I will schedule the patient with pain management, Flex-ex Xray C-spine and L-spine, and a follow-up in 3 months.

## 2020-10-30 ENCOUNTER — TELEPHONE (OUTPATIENT)
Dept: NEUROSURGERY | Facility: CLINIC | Age: 75
End: 2020-10-30

## 2020-10-30 NOTE — TELEPHONE ENCOUNTER
Spoke with pt. He is already established with [facility] on the [facility], will be contacting them soon about arranging f/u and injections. Requested pt contact back if he has any difficulty setting up appts. Will make 3 month f/u appt and send slips in the mail.

## 2020-11-02 ENCOUNTER — HOSPITAL ENCOUNTER (OUTPATIENT)
Dept: RADIOLOGY | Facility: OTHER | Age: 75
Discharge: HOME | End: 2020-11-02
Attending: NEUROLOGICAL SURGERY
Payer: MEDICARE

## 2020-11-02 ENCOUNTER — OFFICE VISIT (OUTPATIENT)
Dept: PAIN MEDICINE | Facility: CLINIC | Age: 75
End: 2020-11-02
Attending: NEUROLOGICAL SURGERY
Payer: MEDICARE

## 2020-11-02 VITALS
DIASTOLIC BLOOD PRESSURE: 84 MMHG | TEMPERATURE: 97 F | OXYGEN SATURATION: 100 % | RESPIRATION RATE: 18 BRPM | SYSTOLIC BLOOD PRESSURE: 128 MMHG | BODY MASS INDEX: 35.98 KG/M2 | HEART RATE: 72 BPM | WEIGHT: 229.25 LBS | HEIGHT: 67 IN

## 2020-11-02 DIAGNOSIS — M47.816 LUMBAR SPONDYLOSIS: ICD-10-CM

## 2020-11-02 DIAGNOSIS — M96.1 POST LAMINECTOMY SYNDROME: Primary | ICD-10-CM

## 2020-11-02 DIAGNOSIS — M54.12 CERVICAL RADICULOPATHY: ICD-10-CM

## 2020-11-02 DIAGNOSIS — Z98.1 S/P LUMBAR FUSION: ICD-10-CM

## 2020-11-02 DIAGNOSIS — M54.2 CERVICAL PAIN (NECK): ICD-10-CM

## 2020-11-02 DIAGNOSIS — G89.4 CHRONIC PAIN DISORDER: ICD-10-CM

## 2020-11-02 DIAGNOSIS — M47.22 CERVICAL SPONDYLOSIS WITH RADICULOPATHY: ICD-10-CM

## 2020-11-02 DIAGNOSIS — M54.16 LUMBAR RADICULOPATHY: ICD-10-CM

## 2020-11-02 PROCEDURE — 3008F BODY MASS INDEX DOCD: CPT | Mod: CPTII,S$GLB,, | Performed by: ANESTHESIOLOGY

## 2020-11-02 PROCEDURE — 72050 X-RAY EXAM NECK SPINE 4/5VWS: CPT | Mod: 26,,, | Performed by: RADIOLOGY

## 2020-11-02 PROCEDURE — 1101F PT FALLS ASSESS-DOCD LE1/YR: CPT | Mod: CPTII,S$GLB,, | Performed by: ANESTHESIOLOGY

## 2020-11-02 PROCEDURE — 1101F PR PT FALLS ASSESS DOC 0-1 FALLS W/OUT INJ PAST YR: ICD-10-PCS | Mod: CPTII,S$GLB,, | Performed by: ANESTHESIOLOGY

## 2020-11-02 PROCEDURE — 3008F PR BODY MASS INDEX (BMI) DOCUMENTED: ICD-10-PCS | Mod: CPTII,S$GLB,, | Performed by: ANESTHESIOLOGY

## 2020-11-02 PROCEDURE — 1159F PR MEDICATION LIST DOCUMENTED IN MEDICAL RECORD: ICD-10-PCS | Mod: S$GLB,,, | Performed by: ANESTHESIOLOGY

## 2020-11-02 PROCEDURE — 72120 X-RAY BEND ONLY L-S SPINE: CPT | Mod: TC,FY

## 2020-11-02 PROCEDURE — 99999 PR PBB SHADOW E&M-EST. PATIENT-LVL IV: CPT | Mod: PBBFAC,,, | Performed by: ANESTHESIOLOGY

## 2020-11-02 PROCEDURE — 99204 PR OFFICE/OUTPT VISIT, NEW, LEVL IV, 45-59 MIN: ICD-10-PCS | Mod: S$GLB,,, | Performed by: ANESTHESIOLOGY

## 2020-11-02 PROCEDURE — 72050 XR CERVICAL SPINE AP LAT WITH FLEX EXTEN: ICD-10-PCS | Mod: 26,,, | Performed by: RADIOLOGY

## 2020-11-02 PROCEDURE — 1125F PR PAIN SEVERITY QUANTIFIED, PAIN PRESENT: ICD-10-PCS | Mod: S$GLB,,, | Performed by: ANESTHESIOLOGY

## 2020-11-02 PROCEDURE — 1159F MED LIST DOCD IN RCRD: CPT | Mod: S$GLB,,, | Performed by: ANESTHESIOLOGY

## 2020-11-02 PROCEDURE — 1125F AMNT PAIN NOTED PAIN PRSNT: CPT | Mod: S$GLB,,, | Performed by: ANESTHESIOLOGY

## 2020-11-02 PROCEDURE — 72050 X-RAY EXAM NECK SPINE 4/5VWS: CPT | Mod: TC,FY

## 2020-11-02 PROCEDURE — 72120 XR LUMBAR SPINE FLEXION AND EXTENSION ONLY: ICD-10-PCS | Mod: 26,,, | Performed by: RADIOLOGY

## 2020-11-02 PROCEDURE — 99204 OFFICE O/P NEW MOD 45 MIN: CPT | Mod: S$GLB,,, | Performed by: ANESTHESIOLOGY

## 2020-11-02 PROCEDURE — 99999 PR PBB SHADOW E&M-EST. PATIENT-LVL IV: ICD-10-PCS | Mod: PBBFAC,,, | Performed by: ANESTHESIOLOGY

## 2020-11-02 PROCEDURE — 72120 X-RAY BEND ONLY L-S SPINE: CPT | Mod: 26,,, | Performed by: RADIOLOGY

## 2020-11-02 NOTE — PROGRESS NOTES
Chronic Pain - New Consult    Referring Physician: Alirio Garner MD    Chief Complaint:   Chief Complaint   Patient presents with    Low-back Pain    Neck Pain        SUBJECTIVE:    Michi Agrawal presents to the clinic for the evaluation of neck and lower back pain. The pain started several years ago and symptoms have been worsening. He is s/p C4-C5 ACDF (1995), L2-S1 PSIF (2011), and right hip replacement. The pain is located in the cervical and sacral area and radiates to bilateral arms and legs.  The pain is described as numbing and is rated as 5/10. The pain is rated with a score of  3/10 on the BEST day and a score of 6/10 on the WORST day.  Symptoms interfere with sleeping. The pain is exacerbated by Sitting, Walking and Night Time.  The pain is mitigated by rest, lifting his arms, and pain medication. Patient also complains of some numbness and tingling in his penis. No urinary incontinence present.     Patient denies night fever/night sweats, urinary incontinence, bowel incontinence, significant weight loss and significant motor weakness.    Physical Therapy/Home Exercise: no      Pain Disability Index Review:  No flowsheet data found.    Pain Medications:    - Opioids: Norco  mg 3-4 times a day   -Neurontin 800 mg BID     report:  Reviewed and consistent with medication use as prescribed.    Pain Procedures:   Reports prior steroid injections with some relief  Also reports prior spinal cord stimulator trial with no relief of pain    Imaging:     EMG with ultrasound and nerve conduction (10/27/20):  Impression:   1. There is evidence of acute BILATERAL S1 radiculopathies with active/ongoing denervation   2. There is evidence of acute BILATERAL C6 and possibly LEFT C7 radiculopathies with active/ongoing denervation.     3. There is electrophysiologic evidence of a bilateral sensorimotor median mononeuropathy across the wrist (I.e. Carpal tunnel syndrome).  There is no motor axonal loss.  There  is active denervation on the left, not on the right.  This is graded as Moderate in severity on the right, and Severe on the Left.    Xray cervical spine (10/28)  Impression:  No evidence for acute fracture, bone destruction, or subluxation.  No instability elicited on the flexion and extension radiographs.  Cervical spondylosis    Xray lumbar spine (10/28)  Impression:  Postoperative changes of spinal fusion extending from the L2 level to the S1 level.  No evidence for hardware failure.  No motion on the flexion and extension radiographs.  Degenerative changes at the L1-L2 level.  No instability elicited at the L1-L2 level.     Atherosclerosis.  CT lumbar spine (10/7/20):  Impression:  2. Postoperative change of spinal and osseous fusion, medical screws, and intervertebral disc spacers as detailed above.  2. Lucency surrounding both S1 pedicle screws, potentially representing mild loosening.  3. Multilevel foraminal narrowing and central canal stenosis as detailed above.    MRI lumbar spine  (9/29/20):  Impression:  Postoperative changes of posterior fusion from L2-S1.  Mild-to-moderate multilevel foraminal narrowing secondary to facet arthrosis.  L1-L2 disc bulge which combines with posterior ligamentous hypertrophy and facet joint hypertrophic change to produce moderate central canal stenosis and mild to moderate bilateral foraminal narrowing.    MRI cervical spine (9/29/20)  Impression:  Osseous fusion of the C4 and C5 bodies, unchanged from the previous exam.  Mild multilevel disc bulges, most significant at C5-C6 where the disc bulge appears to produce mild ventral spinal cord compression.  Please see the above report for level by level detail.        Past Medical History:   Diagnosis Date    Cerebral aneurysm     GERD (gastroesophageal reflux disease)     Hyperlipidemia     Hypertension      Past Surgical History:   Procedure Laterality Date    CEREBRAL ANEURYSM REPAIR      cervical spinal surgery       CHOLECYSTECTOMY      SPINE SURGERY      x 3 lumbar    TOTAL HIP ARTHROPLASTY      right     Social History     Socioeconomic History    Marital status:      Spouse name: Not on file    Number of children: Not on file    Years of education: Not on file    Highest education level: Not on file   Occupational History    Not on file   Social Needs    Financial resource strain: Not on file    Food insecurity     Worry: Not on file     Inability: Not on file    Transportation needs     Medical: Not on file     Non-medical: Not on file   Tobacco Use    Smoking status: Former Smoker   Substance and Sexual Activity    Alcohol use: No    Drug use: No    Sexual activity: Not on file   Lifestyle    Physical activity     Days per week: Not on file     Minutes per session: Not on file    Stress: Not on file   Relationships    Social connections     Talks on phone: Not on file     Gets together: Not on file     Attends Hinduism service: Not on file     Active member of club or organization: Not on file     Attends meetings of clubs or organizations: Not on file     Relationship status: Not on file   Other Topics Concern    Not on file   Social History Narrative    Not on file     Family History   Problem Relation Age of Onset    Lung cancer Father     Cancer Brother 45        agent orange    Cancer Sister 60        intestinal cancer       Review of patient's allergies indicates:  No Known Allergies    Current Outpatient Medications   Medication Sig    aspirin 81 MG Chew Take 81 mg by mouth once daily.    citalopram (CELEXA) 20 MG tablet Take 1 tablet by mouth Daily.    gabapentin (NEURONTIN) 600 MG tablet Take 600 mg by mouth 3 (three) times daily.    HYDROcodone-acetaminophen (NORCO)  mg per tablet Take 1 tablet by mouth as needed for Pain.    metoprolol tartrate (LOPRESSOR) 100 MG tablet Take 1 tablet by mouth Daily.    morphine (MS CONTIN) 15 MG 12 hr tablet Take 1 tablet by mouth Daily.  "   omeprazole (PRILOSEC) 20 MG capsule Take 1 tablet by mouth Daily.    pravastatin (PRAVACHOL) 40 MG tablet Take 1 tablet by mouth Daily.    hydrocodone-acetaminophen (LORTAB)  mg per tablet Take 1 tablet by mouth every 6 (six) hours as needed. (Patient not taking: Reported on 9/29/2020)     No current facility-administered medications for this visit.        REVIEW OF SYSTEMS:    GENERAL:  No weight loss, malaise or fevers.  HEENT:  Negative for frequent or significant headaches.  NECK:  Negative for lumps, goiter and significant neck swelling. +neck pain  RESPIRATORY:  Negative for cough, wheezing or shortness of breath.  CARDIOVASCULAR:  Negative for chest pain, leg swelling or palpitations.  GI:  GERD Negative for abdominal discomfort, blood in stools or black stools or change in bowel habits.  MUSCULOSKELETAL:  See HPI.  SKIN:  Negative for lesions, rash, and itching.  PSYCH:  Negative for mood disorder and recent psychosocial stressors. +sleep disturbance  HEMATOLOGY/LYMPHOLOGY:  Negative for prolonged bleeding, bruising easily or swollen nodes.  NEURO:   No history of headaches, syncope, paralysis, seizures or tremors.  All other reviewed and negative other than HPI.    OBJECTIVE:    /84   Pulse 72   Temp 97.4 °F (36.3 °C)   Resp 18   Ht 5' 7" (1.702 m)   Wt 104 kg (229 lb 4.5 oz)   SpO2 100%   BMI 35.91 kg/m²     PHYSICAL EXAMINATION:    General appearance: Well appearing, in no acute distress, alert and oriented x3.  Psych:  Mood and affect appropriate.  Skin: Skin color, texture, turgor normal, no rashes or lesions, in both upper and lower body.  Head/face:  Normocephalic, atraumatic. No palpable lymph nodes.  Neck: Positive pain to palpation over the cervical paraspinous muscles. Spurling Negative. Limited ROM of cervical spine.  Cor: RRR  Pulm: CTA  GI:  Soft and non-tender.  Back: Straight leg raising in the sitting and supine positions is negative to radicular pain.  Tenderness to " palpation over bilateral lumbar paraspinal muscles.  Limited range of motion of lumbar spine.  Extremities: Peripheral joint ROM is full and pain free without obvious instability or laxity in all four extremities. No deformities, edema, or skin discoloration. Good capillary refill.  Musculoskeletal: Shoulder, hip, sacroiliac and knee provocative maneuvers are negative. Bilateral upper and lower extremity strength is normal and symmetric.  No atrophy or tone abnormalities are noted. Negative straight leg test. Right leg extension less than left leg extension.   Neuro: Bilateral upper and lower extremity coordination and muscle stretch reflexes are physiologic and symmetric.  Plantar response are downgoing. No loss of sensation is noted.  Gait:  Antalgic.    ASSESSMENT: 74 y.o. year old male with bilateral S1 radiculopathy and bilateral C6 radiculopathy, consistent with Post laminectomy syndrome.    1. Post laminectomy syndrome     2. Lumbar spondylosis  Ambulatory referral/consult to Pain Clinic   3. Cervical pain (neck)  Ambulatory referral/consult to Pain Clinic   4. S/P lumbar fusion  Ambulatory referral/consult to Pain Clinic   5. Chronic pain disorder     6. Cervical radiculopathy     7. Lumbar radiculopathy           PLAN:     - Patient consented and scheduled for S1 TFESI with possibility of caudal steroid injection. Also consented and scheduled for C7-T1 ALISA one week after.  - I have stressed the importance of physical activity and a home exercise plan to help with pain and improve health.  - Patient can continue with medications for now since they are providing benefits, using them appropriately, and without side effects.  - RTC 2 weeks after procedure.  - Counseled patient regarding the importance of activity modification and constant sleeping habits.    The above plan and management options were discussed at length with patient. Patient is in agreement with the above and verbalized understanding. It will  be communicated with the referring physician via electronic record, fax, or mail.    Lindsey Grover  11/02/2020     I have reviewed and concur with the resident's history, physical, assessment, and plan.  I have personally interviewed and examined the patient at bedside.  See below addendum for my evaluation and additional findings.  74-year-old male with chronic neck and lower back pain consistent with cervical post laminectomy syndrome and lumbar post-laminectomy syndrome.  We will schedule the patient for bilateral S1 transforaminal epidural steroid injection versus caudal epidural steroid injection.  We will also schedule him for cervical ALISA at C7/T1 level 1 week after.  Will follow up with him 2 weeks after procedure.    Rustam Donahue MD

## 2020-11-02 NOTE — LETTER
November 2, 2020      Alirio Garner MD  [address]  [facility] [address]           [facility]  [address]  [facility] [address]  Phone: [phone]  Fax: [phone]          Patient: Michi Agrawal   MR Number: [id number]   YOB: 1945   Date of Visit: 11/2/2020       Dear Dr. Alirio Garner:    Thank you for referring Michi Agrawal to me for evaluation. Attached you will find relevant portions of my assessment and plan of care.    If you have questions, please do not hesitate to call me. I look forward to following Michi Agrawal along with you.    Sincerely,    Rustam Donahue MD    Enclosure  CC:  No Recipients    If you would like to receive this communication electronically, please contact [email or web address] or [phone] to request more information on [email or web address] Link access.    For providers and/or their staff who would like to refer a patient to [facility], please contact us through our one-stop-shop provider referral line, [facility], at [phone].    If you feel you have received this communication in error or would no longer like to receive these types of communications, please e-mail [email or web address]

## 2020-11-02 NOTE — PROGRESS NOTES
Chronic Pain - New Consult    Referring Physician: Alirio Garner MD    Chief Complaint:   Chief Complaint   Patient presents with    Low-back Pain    Neck Pain        SUBJECTIVE:    Michi Agrawal presents to the clinic for the evaluation of neck and lower back pain. The pain started several years ago and symptoms have been worsening. He is s/p C4-C5 ACDF (1995), L2-S1 PSIF (2011), and right hip replacement. The pain is located in the cervical and sacral area and radiates to bilateral arms and legs.  The pain is described as numbing and is rated as 5/10. The pain is rated with a score of  3/10 on the BEST day and a score of 6/10 on the WORST day.  Symptoms interfere with sleeping. The pain is exacerbated by Sitting, Walking and Night Time.  The pain is mitigated by rest, lifting his arms, and pain medication. Patient also complains of some numbness and tingling in his penis. No urinary incontinence present.     Patient denies night fever/night sweats, urinary incontinence, bowel incontinence, significant weight loss and significant motor weakness.    Physical Therapy/Home Exercise: no      Pain Disability Index Review:  No flowsheet data found.    Pain Medications:    - Opioids: Norco  mg 3-4 times a day   -Neurontin 800 mg BID     report:  Reviewed and consistent with medication use as prescribed.    Pain Procedures:   Reports prior steroid injections with some relief  Also reports prior spinal cord stimulator trial with no relief of pain    Imaging:     EMG with ultrasound and nerve conduction (10/27/20):  Impression:   1. There is evidence of acute BILATERAL S1 radiculopathies with active/ongoing denervation   2. There is evidence of acute BILATERAL C6 and possibly LEFT C7 radiculopathies with active/ongoing denervation.     3. There is electrophysiologic evidence of a bilateral sensorimotor median mononeuropathy across the wrist (I.e. Carpal tunnel syndrome).  There is no motor axonal loss.  There  is active denervation on the left, not on the right.  This is graded as Moderate in severity on the right, and Severe on the Left.    Xray cervical spine (10/28)  Impression:  No evidence for acute fracture, bone destruction, or subluxation.  No instability elicited on the flexion and extension radiographs.  Cervical spondylosis    Xray lumbar spine (10/28)  Impression:  Postoperative changes of spinal fusion extending from the L2 level to the S1 level.  No evidence for hardware failure.  No motion on the flexion and extension radiographs.  Degenerative changes at the L1-L2 level.  No instability elicited at the L1-L2 level.     Atherosclerosis.  CT lumbar spine (10/7/20):  Impression:  2. Postoperative change of spinal and osseous fusion, medical screws, and intervertebral disc spacers as detailed above.  2. Lucency surrounding both S1 pedicle screws, potentially representing mild loosening.  3. Multilevel foraminal narrowing and central canal stenosis as detailed above.    MRI lumbar spine  (9/29/20):  Impression:  Postoperative changes of posterior fusion from L2-S1.  Mild-to-moderate multilevel foraminal narrowing secondary to facet arthrosis.  L1-L2 disc bulge which combines with posterior ligamentous hypertrophy and facet joint hypertrophic change to produce moderate central canal stenosis and mild to moderate bilateral foraminal narrowing.    MRI cervical spine (9/29/20)  Impression:  Osseous fusion of the C4 and C5 bodies, unchanged from the previous exam.  Mild multilevel disc bulges, most significant at C5-C6 where the disc bulge appears to produce mild ventral spinal cord compression.  Please see the above report for level by level detail.        Past Medical History:   Diagnosis Date    Cerebral aneurysm     GERD (gastroesophageal reflux disease)     Hyperlipidemia     Hypertension      Past Surgical History:   Procedure Laterality Date    CEREBRAL ANEURYSM REPAIR      cervical spinal surgery       CHOLECYSTECTOMY      SPINE SURGERY      x 3 lumbar    TOTAL HIP ARTHROPLASTY      right     Social History     Socioeconomic History    Marital status:      Spouse name: Not on file    Number of children: Not on file    Years of education: Not on file    Highest education level: Not on file   Occupational History    Not on file   Social Needs    Financial resource strain: Not on file    Food insecurity     Worry: Not on file     Inability: Not on file    Transportation needs     Medical: Not on file     Non-medical: Not on file   Tobacco Use    Smoking status: Former Smoker   Substance and Sexual Activity    Alcohol use: No    Drug use: No    Sexual activity: Not on file   Lifestyle    Physical activity     Days per week: Not on file     Minutes per session: Not on file    Stress: Not on file   Relationships    Social connections     Talks on phone: Not on file     Gets together: Not on file     Attends [employer] service: Not on file     Active member of club or organization: Not on file     Attends meetings of clubs or organizations: Not on file     Relationship status: Not on file   Other Topics Concern    Not on file   Social History Narrative    Not on file     Family History   Problem Relation Age of Onset    Lung cancer Father     Cancer Brother 45        agent orange    Cancer Sister 60        intestinal cancer       Review of patient's allergies indicates:  No Known Allergies    Current Outpatient Medications   Medication Sig    aspirin 81 MG Chew Take 81 mg by mouth once daily.    citalopram (CELEXA) 20 MG tablet Take 1 tablet by mouth Daily.    gabapentin (NEURONTIN) 600 MG tablet Take 600 mg by mouth 3 (three) times daily.    HYDROcodone-acetaminophen (NORCO)  mg per tablet Take 1 tablet by mouth as needed for Pain.    metoprolol tartrate (LOPRESSOR) 100 MG tablet Take 1 tablet by mouth Daily.    morphine (MS CONTIN) 15 MG 12 hr tablet Take 1 tablet by mouth Daily.  "   omeprazole (PRILOSEC) 20 MG capsule Take 1 tablet by mouth Daily.    pravastatin (PRAVACHOL) 40 MG tablet Take 1 tablet by mouth Daily.    hydrocodone-acetaminophen (LORTAB)  mg per tablet Take 1 tablet by mouth every 6 (six) hours as needed. (Patient not taking: Reported on 9/29/2020)     No current facility-administered medications for this visit.        REVIEW OF SYSTEMS:    GENERAL:  No weight loss, malaise or fevers.  HEENT:  Negative for frequent or significant headaches.  NECK:  Negative for lumps, goiter and significant neck swelling. +neck pain  RESPIRATORY:  Negative for cough, wheezing or shortness of breath.  CARDIOVASCULAR:  Negative for chest pain, leg swelling or palpitations.  GI:  GERD Negative for abdominal discomfort, blood in stools or black stools or change in bowel habits.  MUSCULOSKELETAL:  See HPI.  SKIN:  Negative for lesions, rash, and itching.  PSYCH:  Negative for mood disorder and recent psychosocial stressors. +sleep disturbance  HEMATOLOGY/LYMPHOLOGY:  Negative for prolonged bleeding, bruising easily or swollen nodes.  NEURO:   No history of headaches, syncope, paralysis, seizures or tremors.  All other reviewed and negative other than HPI.    OBJECTIVE:    /84   Pulse 72   Temp 97.4 °F (36.3 °C)   Resp 18   Ht 5' 7" (1.702 m)   Wt 104 kg (229 lb 4.5 oz)   SpO2 100%   BMI 35.91 kg/m²     PHYSICAL EXAMINATION:    General appearance: Well appearing, in no acute distress, alert and oriented x3.  Psych:  Mood and affect appropriate.  Skin: Skin color, texture, turgor normal, no rashes or lesions, in both upper and lower body.  Head/face:  Normocephalic, atraumatic. No palpable lymph nodes.  Neck: Positive pain to palpation over the cervical paraspinous muscles. Spurling Negative. Limited ROM of cervical spine.  Cor: RRR  Pulm: CTA  GI:  Soft and non-tender.  Back: Straight leg raising in the sitting and supine positions is negative to radicular pain.  Tenderness to " palpation over bilateral lumbar paraspinal muscles.  Limited range of motion of lumbar spine.  Extremities: Peripheral joint ROM is full and pain free without obvious instability or laxity in all four extremities. No deformities, edema, or skin discoloration. Good capillary refill.  Musculoskeletal: Shoulder, hip, sacroiliac and knee provocative maneuvers are negative. Bilateral upper and lower extremity strength is normal and symmetric.  No atrophy or tone abnormalities are noted. Negative straight leg test. Right leg extension less than left leg extension.   Neuro: Bilateral upper and lower extremity coordination and muscle stretch reflexes are physiologic and symmetric.  Plantar response are downgoing. No loss of sensation is noted.  Gait:  Antalgic.    ASSESSMENT: 74 y.o. year old male with bilateral S1 radiculopathy and bilateral C6 radiculopathy, consistent with Post laminectomy syndrome.    1. Post laminectomy syndrome     2. Lumbar spondylosis  Ambulatory referral/consult to Pain Clinic   3. Cervical pain (neck)  Ambulatory referral/consult to Pain Clinic   4. S/P lumbar fusion  Ambulatory referral/consult to Pain Clinic   5. Chronic pain disorder     6. Cervical radiculopathy     7. Lumbar radiculopathy           PLAN:     - Patient consented and scheduled for S1 TFESI with possibility of caudal steroid injection. Also consented and scheduled for C7-T1 ALISA one week after.  - I have stressed the importance of physical activity and a home exercise plan to help with pain and improve health.  - Patient can continue with medications for now since they are providing benefits, using them appropriately, and without side effects.  - RTC 2 weeks after procedure.  - Counseled patient regarding the importance of activity modification and constant sleeping habits.    The above plan and management options were discussed at length with patient. Patient is in agreement with the above and verbalized understanding. It will  be communicated with the referring physician via electronic record, fax, or mail.    Lindsey Grover  11/02/2020     I have reviewed and concur with the resident's history, physical, assessment, and plan.  I have personally interviewed and examined the patient at bedside.  See below addendum for my evaluation and additional findings.  74-year-old male with chronic neck and lower back pain consistent with cervical post laminectomy syndrome and lumbar post-laminectomy syndrome.  We will schedule the patient for bilateral S1 transforaminal epidural steroid injection versus caudal epidural steroid injection.  We will also schedule him for cervical ALISA at C7/T1 level 1 week after.  Will follow up with him 2 weeks after procedure.    Rustam Donahue MD

## 2020-11-10 ENCOUNTER — HOSPITAL ENCOUNTER (OUTPATIENT)
Facility: OTHER | Age: 75
Discharge: HOME | End: 2020-11-10
Attending: ANESTHESIOLOGY | Admitting: ANESTHESIOLOGY
Payer: MEDICARE

## 2020-11-10 VITALS
SYSTOLIC BLOOD PRESSURE: 164 MMHG | DIASTOLIC BLOOD PRESSURE: 81 MMHG | WEIGHT: 225 LBS | BODY MASS INDEX: 34.1 KG/M2 | TEMPERATURE: 98 F | HEIGHT: 68 IN | HEART RATE: 69 BPM | RESPIRATION RATE: 18 BRPM | OXYGEN SATURATION: 99 %

## 2020-11-10 DIAGNOSIS — G89.29 CHRONIC PAIN: ICD-10-CM

## 2020-11-10 DIAGNOSIS — M54.16 LUMBAR RADICULOPATHY: Primary | ICD-10-CM

## 2020-11-10 PROCEDURE — 25000003 PHARM REV CODE 250: Performed by: ANESTHESIOLOGY

## 2020-11-10 PROCEDURE — 63600175 PHARM REV CODE 636 W HCPCS: Performed by: ANESTHESIOLOGY

## 2020-11-10 PROCEDURE — 62327 NJX INTERLAMINAR LMBR/SAC: CPT | Mod: ,,, | Performed by: ANESTHESIOLOGY

## 2020-11-10 PROCEDURE — 25500020 PHARM REV CODE 255: Performed by: ANESTHESIOLOGY

## 2020-11-10 PROCEDURE — 62327 PR INJ/INFUS, LUMBAR/SACRAL INDWELL CATH, W/GUIDANCE: ICD-10-PCS | Mod: ,,, | Performed by: ANESTHESIOLOGY

## 2020-11-10 PROCEDURE — 62327 NJX INTERLAMINAR LMBR/SAC: CPT | Performed by: ANESTHESIOLOGY

## 2020-11-10 RX ORDER — FENTANYL CITRATE 50 UG/ML
INJECTION, SOLUTION INTRAMUSCULAR; INTRAVENOUS
Status: DISCONTINUED | OUTPATIENT
Start: 2020-11-10 | End: 2020-11-10 | Stop reason: HOSPADM

## 2020-11-10 RX ORDER — SODIUM CHLORIDE 9 MG/ML
500 INJECTION, SOLUTION INTRAVENOUS CONTINUOUS
Status: ACTIVE | OUTPATIENT
Start: 2020-11-10 | End: 2020-11-11

## 2020-11-10 RX ORDER — DEXAMETHASONE SODIUM PHOSPHATE 10 MG/ML
INJECTION INTRAMUSCULAR; INTRAVENOUS
Status: DISCONTINUED | OUTPATIENT
Start: 2020-11-10 | End: 2020-11-10 | Stop reason: HOSPADM

## 2020-11-10 RX ORDER — LIDOCAINE HYDROCHLORIDE 5 MG/ML
INJECTION, SOLUTION INFILTRATION; INTRAVENOUS
Status: DISCONTINUED | OUTPATIENT
Start: 2020-11-10 | End: 2020-11-10 | Stop reason: HOSPADM

## 2020-11-10 RX ORDER — LIDOCAINE HYDROCHLORIDE 10 MG/ML
INJECTION INFILTRATION; PERINEURAL
Status: DISCONTINUED | OUTPATIENT
Start: 2020-11-10 | End: 2020-11-10 | Stop reason: HOSPADM

## 2020-11-10 RX ORDER — MIDAZOLAM HYDROCHLORIDE 1 MG/ML
INJECTION INTRAMUSCULAR; INTRAVENOUS
Status: DISCONTINUED | OUTPATIENT
Start: 2020-11-10 | End: 2020-11-10 | Stop reason: HOSPADM

## 2020-11-10 RX ADMIN — SODIUM CHLORIDE 500 ML: 0.9 INJECTION, SOLUTION INTRAVENOUS at 08:11

## 2020-11-18 ENCOUNTER — PATIENT MESSAGE (OUTPATIENT)
Dept: PAIN MEDICINE | Facility: OTHER | Age: 75
End: 2020-11-18

## 2020-12-04 ENCOUNTER — HOSPITAL ENCOUNTER (OUTPATIENT)
Facility: OTHER | Age: 75
Discharge: HOME | End: 2020-12-04
Attending: ANESTHESIOLOGY | Admitting: ANESTHESIOLOGY
Payer: MEDICARE

## 2020-12-04 VITALS
RESPIRATION RATE: 16 BRPM | TEMPERATURE: 98 F | SYSTOLIC BLOOD PRESSURE: 132 MMHG | BODY MASS INDEX: 33.34 KG/M2 | OXYGEN SATURATION: 96 % | HEIGHT: 68 IN | DIASTOLIC BLOOD PRESSURE: 82 MMHG | HEART RATE: 66 BPM | WEIGHT: 220 LBS

## 2020-12-04 DIAGNOSIS — M54.12 CERVICAL RADICULOPATHY: Primary | ICD-10-CM

## 2020-12-04 DIAGNOSIS — G89.29 CHRONIC PAIN: ICD-10-CM

## 2020-12-04 PROCEDURE — 62321 NJX INTERLAMINAR CRV/THRC: CPT | Performed by: ANESTHESIOLOGY

## 2020-12-04 PROCEDURE — 25000003 PHARM REV CODE 250: Performed by: ANESTHESIOLOGY

## 2020-12-04 PROCEDURE — 62321 PR INJ CERV/THORAC, W/GUIDANCE: ICD-10-PCS | Mod: ,,, | Performed by: ANESTHESIOLOGY

## 2020-12-04 PROCEDURE — 63600175 PHARM REV CODE 636 W HCPCS: Performed by: ANESTHESIOLOGY

## 2020-12-04 PROCEDURE — 62321 NJX INTERLAMINAR CRV/THRC: CPT | Mod: ,,, | Performed by: ANESTHESIOLOGY

## 2020-12-04 PROCEDURE — 25500020 PHARM REV CODE 255: Performed by: ANESTHESIOLOGY

## 2020-12-04 RX ORDER — LIDOCAINE HYDROCHLORIDE 10 MG/ML
INJECTION INFILTRATION; PERINEURAL
Status: DISCONTINUED | OUTPATIENT
Start: 2020-12-04 | End: 2020-12-04 | Stop reason: HOSPADM

## 2020-12-04 RX ORDER — DEXAMETHASONE SODIUM PHOSPHATE 4 MG/ML
INJECTION, SOLUTION INTRA-ARTICULAR; INTRALESIONAL; INTRAMUSCULAR; INTRAVENOUS; SOFT TISSUE
Status: DISCONTINUED | OUTPATIENT
Start: 2020-12-04 | End: 2020-12-04 | Stop reason: HOSPADM

## 2020-12-04 RX ORDER — LIDOCAINE HYDROCHLORIDE 5 MG/ML
INJECTION, SOLUTION INFILTRATION; INTRAVENOUS
Status: DISCONTINUED | OUTPATIENT
Start: 2020-12-04 | End: 2020-12-04 | Stop reason: HOSPADM

## 2020-12-04 RX ORDER — SODIUM CHLORIDE 9 MG/ML
INJECTION, SOLUTION INTRAVENOUS CONTINUOUS
Status: DISCONTINUED | OUTPATIENT
Start: 2020-12-04 | End: 2020-12-04 | Stop reason: HOSPADM

## 2020-12-04 RX ORDER — ALPRAZOLAM 0.5 MG/1
0.5 TABLET ORAL ONCE
Status: COMPLETED | OUTPATIENT
Start: 2020-12-04 | End: 2020-12-04

## 2020-12-04 RX ADMIN — ALPRAZOLAM 0.5 MG: 0.5 TABLET ORAL at 07:12

## 2020-12-04 NOTE — DISCHARGE SUMMARY
Discharge Note  Short Stay      SUMMARY     Admit Date: 12/4/2020    Attending Physician: Rustam Donahue      Discharge Physician: Rustam Donahue      Discharge Date: 12/4/2020 9:17 AM    Procedure(s) (LRB):  INJECTION, STEROID, EPIDURAL C7-T1 (N/A)    Final Diagnosis: Cervical radiculopathy [M54.12]    Disposition: Home or self care    Patient Instructions:   Current Discharge Medication List      CONTINUE these medications which have NOT CHANGED    Details   aspirin 81 MG Chew Take 81 mg by mouth once daily.      citalopram (CELEXA) 20 MG tablet Take 1 tablet by mouth Daily.      gabapentin (NEURONTIN) 600 MG tablet Take 600 mg by mouth 3 (three) times daily.      hydrocodone-acetaminophen (LORTAB)  mg per tablet Take 1 tablet by mouth every 6 (six) hours as needed.  Qty: 120 tablet, Refills: 0    Associated Diagnoses: Lumbar spondylosis      HYDROcodone-acetaminophen (NORCO)  mg per tablet Take 1 tablet by mouth as needed for Pain.    Comments: Quantity prescribed more than 7 day supply? Press F2 and select one:[id number]        metoprolol tartrate (LOPRESSOR) 100 MG tablet Take 1 tablet by mouth Daily.      morphine (MS CONTIN) 15 MG 12 hr tablet Take 1 tablet by mouth Daily.      omeprazole (PRILOSEC) 20 MG capsule Take 1 tablet by mouth Daily.      pravastatin (PRAVACHOL) 40 MG tablet Take 1 tablet by mouth Daily.                 Discharge Diagnosis: Cervical radiculopathy [M54.12]  Condition on Discharge: Stable with no complications to procedure   Diet on Discharge: Same as before.  Activity: as per instruction sheet.  Discharge to: Home with a responsible adult.  Follow up: 2-4 weeks

## 2020-12-04 NOTE — OP NOTE
"Patient Name: Michi Agrawal  MRN: [id number]    INFORMED CONSENT: The procedure, risks, benefits and options were discussed with patient. There are no contraindications to the procedure. The patient expressed understanding and agreed to proceed. The personnel performing the procedure was discussed. I verify that I personally obtained Michi WANG's consent prior to the start of the procedure and the signed consent can be found on the patient's chart.    Procedure Date: 12/04/2020    Anesthesia: Topical    Pre Procedure diagnosis: Cervical radiculopathy [M54.12]  1. Cervical radiculopathy    2. Chronic pain      Post-Procedure diagnosis: SAME        Moderate Sedation: None      PROCEDURE: C7/T1 CERVICAL EPIDURAL STEROID INJECTION         DESCRIPTION OF PROCEDURE: The patient was brought to the procedure room. After performing time out.  IV access was obtained prior to the procedure. The patient was positioned prone on the fluoroscopy table. Continuous hemodynamic monitoring was initiated including blood pressure, EKG, and pulse oximetry. The area of the cervical spine was prepped with chlorhexidine and draped in a sterile fashion. Skin anesthesia was achieved using 3 mL of Lidocaine 1% over the respective injection site. The C7/T1 interspace was visualized under fluoroscopic imaging. An 20 gauge 3 1/2" Tuohy needle was slowly inserted and advanced using loss of resistance to saline with AP, oblique and lateral fluoroscopic imaging for needle guidance. Negative aspiration for blood or CSF was confirmed. Epidural contrast spread was confirmed using 2mL of Omnipaque 300 contrast. Spread of the contrast in the cervical epidural space was noted . 6 mL of lidocaine 0.5% and 10 mg decadron was injected. The needle was removed and bleeding was nil. A sterile dressing was applied. No specimens collected. patient was taken back to the recovery room for further observation.     Blood Loss: Nill  Specimen: None    Rustam Donahue, " MD

## 2020-12-04 NOTE — DISCHARGE INSTRUCTIONS

## 2020-12-04 NOTE — H&P
"HPI  Patient presenting for Procedure(s) (LRB):  INJECTION, STEROID, EPIDURAL C7-T1 (N/A)     Patient on Anti-coagulation No    No health changes since previous encounter    Past Medical History:   Diagnosis Date    Cerebral aneurysm     GERD (gastroesophageal reflux disease)     Hyperlipidemia     Hypertension      Past Surgical History:   Procedure Laterality Date    CEREBRAL ANEURYSM REPAIR      cervical spinal surgery      CHOLECYSTECTOMY      INJECTION OF JOINT N/A 11/10/2020    Procedure: INJECTION, JOINT CAUDAL;  Surgeon: Rustam Donahue MD;  [facility];  Service: Pain Management;  Laterality: N/A;  CAUDAL ALISA    SPINE SURGERY      x 3 lumbar    TOTAL HIP ARTHROPLASTY      right     Review of patient's allergies indicates:  No Known Allergies   Current Facility-Administered Medications   Medication    0.9%  NaCl infusion       PMHx, PSHx, Allergies, Medications reviewed in epic    ROS negative except pain complaints in HPI    OBJECTIVE:    /84 (BP Location: Right arm, Patient Position: Lying)   Pulse 72   Temp 97.9 °F (36.6 °C) (Oral)   Resp 19   Ht 5' 8" (1.727 m)   Wt 99.8 kg (220 lb)   SpO2 97%   BMI 33.45 kg/m²     PHYSICAL EXAMINATION:    GENERAL: Well appearing, in no acute distress, alert and oriented x3.  PSYCH:  Mood and affect appropriate.  SKIN: Skin color, texture, turgor normal, no rashes or lesions which will impact the procedure.  CV: RRR with palpation of the radial artery.  PULM: No evidence of respiratory difficulty, symmetric chest rise. Clear to auscultation.  NEURO: Cranial nerves grossly intact.    Plan:    Proceed with procedure as planned Procedure(s) (LRB):  INJECTION, STEROID, EPIDURAL C7-T1 (N/A)    Debby Martinez  12/04/2020          "

## 2020-12-17 ENCOUNTER — TELEPHONE (OUTPATIENT)
Dept: PAIN MEDICINE | Facility: CLINIC | Age: 75
End: 2020-12-17

## 2020-12-17 NOTE — TELEPHONE ENCOUNTER
Good Morning,      This is an appointment reminder about your schedule Audio/Virtual Visit with Pain Management Provider CHRISTINA Wall.   Your appointment is for December 18, 2020 at 9:40am.   Please log into your [facility] Portal 15mins prior to your appointment to e-precramonack, and answer the following questions.  This will notify clinical staff and provider that you are ready to start the Call or Virtual Video Visit.  If you experience any technical issue please contact [phone]         Thank You for entrusting [facility] to handle your care     Pt verbalized understanding and has confirmed appt

## 2021-01-27 ENCOUNTER — OFFICE VISIT (OUTPATIENT)
Dept: NEUROSURGERY | Facility: CLINIC | Age: 76
End: 2021-01-27
Payer: MEDICARE

## 2021-01-27 VITALS
BODY MASS INDEX: 33.45 KG/M2 | SYSTOLIC BLOOD PRESSURE: 157 MMHG | DIASTOLIC BLOOD PRESSURE: 71 MMHG | HEART RATE: 73 BPM | WEIGHT: 220 LBS

## 2021-01-27 DIAGNOSIS — M47.816 LUMBAR SPONDYLOSIS: Primary | ICD-10-CM

## 2021-01-27 DIAGNOSIS — Z98.1 S/P LUMBAR FUSION: ICD-10-CM

## 2021-01-27 DIAGNOSIS — M54.2 CERVICAL PAIN (NECK): ICD-10-CM

## 2021-01-27 DIAGNOSIS — M54.16 LUMBAR RADICULOPATHY: ICD-10-CM

## 2021-01-27 PROCEDURE — 1125F PR PAIN SEVERITY QUANTIFIED, PAIN PRESENT: ICD-10-PCS | Mod: S$GLB,,, | Performed by: NEUROLOGICAL SURGERY

## 2021-01-27 PROCEDURE — 3008F BODY MASS INDEX DOCD: CPT | Mod: CPTII,S$GLB,, | Performed by: NEUROLOGICAL SURGERY

## 2021-01-27 PROCEDURE — 3288F PR FALLS RISK ASSESSMENT DOCUMENTED: ICD-10-PCS | Mod: CPTII,S$GLB,, | Performed by: NEUROLOGICAL SURGERY

## 2021-01-27 PROCEDURE — 99214 PR OFFICE/OUTPT VISIT, EST, LEVL IV, 30-39 MIN: ICD-10-PCS | Mod: S$GLB,,, | Performed by: NEUROLOGICAL SURGERY

## 2021-01-27 PROCEDURE — 99999 PR PBB SHADOW E&M-EST. PATIENT-LVL IV: CPT | Mod: PBBFAC,,, | Performed by: NEUROLOGICAL SURGERY

## 2021-01-27 PROCEDURE — 3008F PR BODY MASS INDEX (BMI) DOCUMENTED: ICD-10-PCS | Mod: CPTII,S$GLB,, | Performed by: NEUROLOGICAL SURGERY

## 2021-01-27 PROCEDURE — 1101F PR PT FALLS ASSESS DOC 0-1 FALLS W/OUT INJ PAST YR: ICD-10-PCS | Mod: CPTII,S$GLB,, | Performed by: NEUROLOGICAL SURGERY

## 2021-01-27 PROCEDURE — 99214 OFFICE O/P EST MOD 30 MIN: CPT | Mod: S$GLB,,, | Performed by: NEUROLOGICAL SURGERY

## 2021-01-27 PROCEDURE — 3288F FALL RISK ASSESSMENT DOCD: CPT | Mod: CPTII,S$GLB,, | Performed by: NEUROLOGICAL SURGERY

## 2021-01-27 PROCEDURE — 1125F AMNT PAIN NOTED PAIN PRSNT: CPT | Mod: S$GLB,,, | Performed by: NEUROLOGICAL SURGERY

## 2021-01-27 PROCEDURE — 1101F PT FALLS ASSESS-DOCD LE1/YR: CPT | Mod: CPTII,S$GLB,, | Performed by: NEUROLOGICAL SURGERY

## 2021-01-27 PROCEDURE — 99999 PR PBB SHADOW E&M-EST. PATIENT-LVL IV: ICD-10-PCS | Mod: PBBFAC,,, | Performed by: NEUROLOGICAL SURGERY

## 2021-01-27 PROCEDURE — 1159F PR MEDICATION LIST DOCUMENTED IN MEDICAL RECORD: ICD-10-PCS | Mod: S$GLB,,, | Performed by: NEUROLOGICAL SURGERY

## 2021-01-27 PROCEDURE — 1159F MED LIST DOCD IN RCRD: CPT | Mod: S$GLB,,, | Performed by: NEUROLOGICAL SURGERY

## 2021-01-27 RX ORDER — IBUPROFEN 800 MG/1
800 TABLET ORAL 3 TIMES DAILY
Qty: 120 TABLET | Refills: 2 | Status: SHIPPED | OUTPATIENT
Start: 2021-01-27 | End: 2021-10-18

## 2021-01-27 RX ORDER — ATORVASTATIN CALCIUM 40 MG/1
40 TABLET, FILM COATED ORAL DAILY
COMMUNITY
Start: 2020-11-30

## 2021-01-27 RX ORDER — TAMSULOSIN HYDROCHLORIDE 0.4 MG/1
0.4 CAPSULE ORAL 2 TIMES DAILY
COMMUNITY
Start: 2020-11-03

## 2021-01-28 ENCOUNTER — TELEPHONE (OUTPATIENT)
Dept: NEUROLOGY | Facility: CLINIC | Age: 76
End: 2021-01-28

## 2021-03-02 ENCOUNTER — OFFICE VISIT (OUTPATIENT)
Dept: NEUROLOGY | Facility: CLINIC | Age: 76
End: 2021-03-02
Payer: MEDICARE

## 2021-03-02 VITALS
BODY MASS INDEX: 33.45 KG/M2 | DIASTOLIC BLOOD PRESSURE: 88 MMHG | SYSTOLIC BLOOD PRESSURE: 157 MMHG | HEIGHT: 68 IN | HEART RATE: 71 BPM

## 2021-03-02 DIAGNOSIS — M54.16 LUMBAR RADICULOPATHY: ICD-10-CM

## 2021-03-02 PROCEDURE — 1101F PR PT FALLS ASSESS DOC 0-1 FALLS W/OUT INJ PAST YR: ICD-10-PCS | Mod: CPTII,S$GLB,, | Performed by: PSYCHIATRY & NEUROLOGY

## 2021-03-02 PROCEDURE — 1101F PT FALLS ASSESS-DOCD LE1/YR: CPT | Mod: CPTII,S$GLB,, | Performed by: PSYCHIATRY & NEUROLOGY

## 2021-03-02 PROCEDURE — 1159F MED LIST DOCD IN RCRD: CPT | Mod: S$GLB,,, | Performed by: PSYCHIATRY & NEUROLOGY

## 2021-03-02 PROCEDURE — 3288F FALL RISK ASSESSMENT DOCD: CPT | Mod: CPTII,S$GLB,, | Performed by: PSYCHIATRY & NEUROLOGY

## 2021-03-02 PROCEDURE — 99204 OFFICE O/P NEW MOD 45 MIN: CPT | Mod: S$GLB,,, | Performed by: PSYCHIATRY & NEUROLOGY

## 2021-03-02 PROCEDURE — 1125F PR PAIN SEVERITY QUANTIFIED, PAIN PRESENT: ICD-10-PCS | Mod: S$GLB,,, | Performed by: PSYCHIATRY & NEUROLOGY

## 2021-03-02 PROCEDURE — 3008F BODY MASS INDEX DOCD: CPT | Mod: CPTII,S$GLB,, | Performed by: PSYCHIATRY & NEUROLOGY

## 2021-03-02 PROCEDURE — 1125F AMNT PAIN NOTED PAIN PRSNT: CPT | Mod: S$GLB,,, | Performed by: PSYCHIATRY & NEUROLOGY

## 2021-03-02 PROCEDURE — 99999 PR PBB SHADOW E&M-EST. PATIENT-LVL III: ICD-10-PCS | Mod: PBBFAC,,, | Performed by: PSYCHIATRY & NEUROLOGY

## 2021-03-02 PROCEDURE — 99999 PR PBB SHADOW E&M-EST. PATIENT-LVL III: CPT | Mod: PBBFAC,,, | Performed by: PSYCHIATRY & NEUROLOGY

## 2021-03-02 PROCEDURE — 1159F PR MEDICATION LIST DOCUMENTED IN MEDICAL RECORD: ICD-10-PCS | Mod: S$GLB,,, | Performed by: PSYCHIATRY & NEUROLOGY

## 2021-03-02 PROCEDURE — 3288F PR FALLS RISK ASSESSMENT DOCUMENTED: ICD-10-PCS | Mod: CPTII,S$GLB,, | Performed by: PSYCHIATRY & NEUROLOGY

## 2021-03-02 PROCEDURE — 99204 PR OFFICE/OUTPT VISIT, NEW, LEVL IV, 45-59 MIN: ICD-10-PCS | Mod: S$GLB,,, | Performed by: PSYCHIATRY & NEUROLOGY

## 2021-03-02 PROCEDURE — 3008F PR BODY MASS INDEX (BMI) DOCUMENTED: ICD-10-PCS | Mod: CPTII,S$GLB,, | Performed by: PSYCHIATRY & NEUROLOGY

## 2021-03-04 ENCOUNTER — TELEPHONE (OUTPATIENT)
Dept: NEUROLOGY | Facility: CLINIC | Age: 76
End: 2021-03-04

## 2021-03-04 DIAGNOSIS — R20.2 PARESTHESIA: Primary | ICD-10-CM

## 2021-03-16 ENCOUNTER — HOSPITAL ENCOUNTER (OUTPATIENT)
Dept: RADIOLOGY | Facility: HOSPITAL | Age: 76
Discharge: HOME | End: 2021-03-16
Attending: PSYCHIATRY & NEUROLOGY
Payer: MEDICARE

## 2021-03-16 DIAGNOSIS — R20.2 PARESTHESIA: ICD-10-CM

## 2021-03-16 LAB
CREAT SERPL-MCNC: 1.4 MG/DL (ref 0.5–1.4)
SAMPLE: NORMAL

## 2021-03-16 PROCEDURE — 72157 MRI CHEST SPINE W/O & W/DYE: CPT | Mod: 26,,, | Performed by: RADIOLOGY

## 2021-03-16 PROCEDURE — A9585 GADOBUTROL INJECTION: HCPCS | Performed by: PSYCHIATRY & NEUROLOGY

## 2021-03-16 PROCEDURE — 25500020 PHARM REV CODE 255: Performed by: PSYCHIATRY & NEUROLOGY

## 2021-03-16 PROCEDURE — 72157 MRI THORACIC SPINE W WO CONTRAST: ICD-10-PCS | Mod: 26,,, | Performed by: RADIOLOGY

## 2021-03-16 PROCEDURE — 72157 MRI CHEST SPINE W/O & W/DYE: CPT | Mod: TC

## 2021-03-16 RX ORDER — GADOBUTROL 604.72 MG/ML
10 INJECTION INTRAVENOUS
Status: COMPLETED | OUTPATIENT
Start: 2021-03-16 | End: 2021-03-16

## 2021-03-16 RX ADMIN — GADOBUTROL 10 ML: 604.72 INJECTION INTRAVENOUS at 06:03

## 2021-03-30 ENCOUNTER — TELEPHONE (OUTPATIENT)
Dept: NEUROLOGY | Facility: CLINIC | Age: 76
End: 2021-03-30

## 2021-07-30 ENCOUNTER — IMMUNIZATION (OUTPATIENT)
Dept: OBSTETRICS AND GYNECOLOGY | Facility: CLINIC | Age: 76
End: 2021-07-30
Payer: MEDICARE

## 2021-07-30 DIAGNOSIS — Z23 NEED FOR VACCINATION: Primary | ICD-10-CM

## 2021-07-30 PROCEDURE — 91300 COVID-19, MRNA, LNP-S, PF, 30 MCG/0.3 ML DOSE VACCINE: CPT | Mod: PBBFAC | Performed by: ANESTHESIOLOGY

## 2021-08-05 ENCOUNTER — APPOINTMENT (OUTPATIENT)
Dept: LAB | Facility: HOSPITAL | Age: 76
End: 2021-08-05
Attending: OTOLARYNGOLOGY
Payer: MEDICARE

## 2021-08-05 DIAGNOSIS — Z01.818 OTHER SPECIFIED PRE-OPERATIVE EXAMINATION: Primary | ICD-10-CM

## 2021-08-05 LAB — SARS-COV-2 RNA RESP QL NAA+PROBE: NOT DETECTED

## 2021-08-05 PROCEDURE — U0002 COVID-19 LAB TEST NON-CDC: HCPCS | Performed by: OTOLARYNGOLOGY

## 2021-08-20 ENCOUNTER — IMMUNIZATION (OUTPATIENT)
Dept: OBSTETRICS AND GYNECOLOGY | Facility: CLINIC | Age: 76
End: 2021-08-20
Payer: MEDICARE

## 2021-08-20 DIAGNOSIS — Z23 NEED FOR VACCINATION: Primary | ICD-10-CM

## 2021-08-20 PROCEDURE — 91300 COVID-19, MRNA, LNP-S, PF, 30 MCG/0.3 ML DOSE VACCINE: CPT | Mod: ,,, | Performed by: ANESTHESIOLOGY

## 2021-08-20 PROCEDURE — 91300 COVID-19, MRNA, LNP-S, PF, 30 MCG/0.3 ML DOSE VACCINE: ICD-10-PCS | Mod: ,,, | Performed by: ANESTHESIOLOGY

## 2021-08-20 PROCEDURE — 0002A COVID-19, MRNA, LNP-S, PF, 30 MCG/0.3 ML DOSE VACCINE: CPT | Mod: CV19,,, | Performed by: ANESTHESIOLOGY

## 2021-08-20 PROCEDURE — 0002A COVID-19, MRNA, LNP-S, PF, 30 MCG/0.3 ML DOSE VACCINE: ICD-10-PCS | Mod: CV19,,, | Performed by: ANESTHESIOLOGY

## 2021-09-02 ENCOUNTER — PATIENT MESSAGE (OUTPATIENT)
Dept: NEUROLOGY | Facility: CLINIC | Age: 76
End: 2021-09-02

## 2021-09-03 ENCOUNTER — PATIENT MESSAGE (OUTPATIENT)
Dept: NEUROSURGERY | Facility: CLINIC | Age: 76
End: 2021-09-03

## 2021-10-04 ENCOUNTER — TELEPHONE (OUTPATIENT)
Dept: NEUROSURGERY | Facility: CLINIC | Age: 76
End: 2021-10-04

## 2021-10-08 ENCOUNTER — TELEPHONE (OUTPATIENT)
Dept: NEUROSURGERY | Facility: CLINIC | Age: 76
End: 2021-10-08

## 2021-10-08 DIAGNOSIS — M54.12 CERVICAL RADICULOPATHY: Primary | ICD-10-CM

## 2021-10-08 DIAGNOSIS — Z98.1 S/P CERVICAL SPINAL FUSION: ICD-10-CM

## 2021-10-14 ENCOUNTER — HOSPITAL ENCOUNTER (OUTPATIENT)
Dept: RADIOLOGY | Facility: HOSPITAL | Age: 76
Discharge: HOME | End: 2021-10-14
Attending: NEUROLOGICAL SURGERY
Payer: MEDICARE

## 2021-10-14 DIAGNOSIS — M54.12 CERVICAL RADICULOPATHY: ICD-10-CM

## 2021-10-14 DIAGNOSIS — Z98.1 S/P CERVICAL SPINAL FUSION: ICD-10-CM

## 2021-10-14 PROCEDURE — 72141 MRI NECK SPINE W/O DYE: CPT | Mod: TC

## 2021-10-15 ENCOUNTER — OFFICE VISIT (OUTPATIENT)
Dept: NEUROSURGERY | Facility: CLINIC | Age: 76
End: 2021-10-15
Payer: MEDICARE

## 2021-10-15 VITALS
HEART RATE: 72 BPM | SYSTOLIC BLOOD PRESSURE: 166 MMHG | WEIGHT: 219.38 LBS | DIASTOLIC BLOOD PRESSURE: 84 MMHG | OXYGEN SATURATION: 99 % | HEIGHT: 68 IN | BODY MASS INDEX: 33.25 KG/M2

## 2021-10-15 DIAGNOSIS — R20.0 LEFT LEG NUMBNESS: Primary | ICD-10-CM

## 2021-10-15 DIAGNOSIS — M25.512 CHRONIC LEFT SHOULDER PAIN: ICD-10-CM

## 2021-10-15 DIAGNOSIS — G89.29 CHRONIC LEFT SHOULDER PAIN: ICD-10-CM

## 2021-10-15 DIAGNOSIS — M54.9 DORSALGIA, UNSPECIFIED: ICD-10-CM

## 2021-10-15 DIAGNOSIS — M43.27 FUSION OF SPINE, LUMBOSACRAL REGION: ICD-10-CM

## 2021-10-15 PROCEDURE — 1125F PR PAIN SEVERITY QUANTIFIED, PAIN PRESENT: ICD-10-PCS | Mod: CPTII,S$GLB,, | Performed by: PHYSICIAN ASSISTANT

## 2021-10-15 PROCEDURE — 1101F PT FALLS ASSESS-DOCD LE1/YR: CPT | Mod: CPTII,S$GLB,, | Performed by: PHYSICIAN ASSISTANT

## 2021-10-15 PROCEDURE — 1125F AMNT PAIN NOTED PAIN PRSNT: CPT | Mod: CPTII,S$GLB,, | Performed by: PHYSICIAN ASSISTANT

## 2021-10-15 PROCEDURE — 99215 PR OFFICE/OUTPT VISIT, EST, LEVL V, 40-54 MIN: ICD-10-PCS | Mod: S$GLB,,, | Performed by: PHYSICIAN ASSISTANT

## 2021-10-15 PROCEDURE — 3288F PR FALLS RISK ASSESSMENT DOCUMENTED: ICD-10-PCS | Mod: CPTII,S$GLB,, | Performed by: PHYSICIAN ASSISTANT

## 2021-10-15 PROCEDURE — 3079F PR MOST RECENT DIASTOLIC BLOOD PRESSURE 80-89 MM HG: ICD-10-PCS | Mod: CPTII,S$GLB,, | Performed by: PHYSICIAN ASSISTANT

## 2021-10-15 PROCEDURE — 99999 PR PBB SHADOW E&M-EST. PATIENT-LVL IV: ICD-10-PCS | Mod: PBBFAC,,, | Performed by: PHYSICIAN ASSISTANT

## 2021-10-15 PROCEDURE — 99215 OFFICE O/P EST HI 40 MIN: CPT | Mod: S$GLB,,, | Performed by: PHYSICIAN ASSISTANT

## 2021-10-15 PROCEDURE — 99999 PR PBB SHADOW E&M-EST. PATIENT-LVL IV: CPT | Mod: PBBFAC,,, | Performed by: PHYSICIAN ASSISTANT

## 2021-10-15 PROCEDURE — 1101F PR PT FALLS ASSESS DOC 0-1 FALLS W/OUT INJ PAST YR: ICD-10-PCS | Mod: CPTII,S$GLB,, | Performed by: PHYSICIAN ASSISTANT

## 2021-10-15 PROCEDURE — 1160F RVW MEDS BY RX/DR IN RCRD: CPT | Mod: CPTII,S$GLB,, | Performed by: PHYSICIAN ASSISTANT

## 2021-10-15 PROCEDURE — 3077F PR MOST RECENT SYSTOLIC BLOOD PRESSURE >= 140 MM HG: ICD-10-PCS | Mod: CPTII,S$GLB,, | Performed by: PHYSICIAN ASSISTANT

## 2021-10-15 PROCEDURE — 3077F SYST BP >= 140 MM HG: CPT | Mod: CPTII,S$GLB,, | Performed by: PHYSICIAN ASSISTANT

## 2021-10-15 PROCEDURE — 3079F DIAST BP 80-89 MM HG: CPT | Mod: CPTII,S$GLB,, | Performed by: PHYSICIAN ASSISTANT

## 2021-10-15 PROCEDURE — 1160F PR REVIEW ALL MEDS BY PRESCRIBER/CLIN PHARMACIST DOCUMENTED: ICD-10-PCS | Mod: CPTII,S$GLB,, | Performed by: PHYSICIAN ASSISTANT

## 2021-10-15 PROCEDURE — 3288F FALL RISK ASSESSMENT DOCD: CPT | Mod: CPTII,S$GLB,, | Performed by: PHYSICIAN ASSISTANT

## 2021-10-15 PROCEDURE — 1159F MED LIST DOCD IN RCRD: CPT | Mod: CPTII,S$GLB,, | Performed by: PHYSICIAN ASSISTANT

## 2021-10-15 PROCEDURE — 3008F BODY MASS INDEX DOCD: CPT | Mod: CPTII,S$GLB,, | Performed by: PHYSICIAN ASSISTANT

## 2021-10-15 PROCEDURE — 3008F PR BODY MASS INDEX (BMI) DOCUMENTED: ICD-10-PCS | Mod: CPTII,S$GLB,, | Performed by: PHYSICIAN ASSISTANT

## 2021-10-15 PROCEDURE — 1159F PR MEDICATION LIST DOCUMENTED IN MEDICAL RECORD: ICD-10-PCS | Mod: CPTII,S$GLB,, | Performed by: PHYSICIAN ASSISTANT

## 2021-10-18 ENCOUNTER — OFFICE VISIT (OUTPATIENT)
Dept: ORTHOPEDICS | Facility: CLINIC | Age: 76
End: 2021-10-18
Payer: MEDICARE

## 2021-10-18 ENCOUNTER — HOSPITAL ENCOUNTER (OUTPATIENT)
Dept: RADIOLOGY | Facility: HOSPITAL | Age: 76
Discharge: HOME | End: 2021-10-18
Attending: NURSE PRACTITIONER
Payer: MEDICARE

## 2021-10-18 VITALS
HEIGHT: 68 IN | BODY MASS INDEX: 33.04 KG/M2 | WEIGHT: 218 LBS | OXYGEN SATURATION: 98 % | TEMPERATURE: 97 F | HEART RATE: 56 BPM

## 2021-10-18 DIAGNOSIS — M25.512 ACUTE PAIN OF LEFT SHOULDER: ICD-10-CM

## 2021-10-18 DIAGNOSIS — M19.012 ARTHRITIS OF SHOULDER REGION, LEFT: ICD-10-CM

## 2021-10-18 DIAGNOSIS — M25.512 ACUTE PAIN OF LEFT SHOULDER: Primary | ICD-10-CM

## 2021-10-18 DIAGNOSIS — M25.512 CHRONIC LEFT SHOULDER PAIN: ICD-10-CM

## 2021-10-18 DIAGNOSIS — M75.42 SHOULDER IMPINGEMENT SYNDROME, LEFT: Primary | ICD-10-CM

## 2021-10-18 DIAGNOSIS — G89.29 CHRONIC LEFT SHOULDER PAIN: ICD-10-CM

## 2021-10-18 PROCEDURE — 73030 X-RAY EXAM OF SHOULDER: CPT | Mod: TC,LT

## 2021-10-18 PROCEDURE — 20610 DRAIN/INJ JOINT/BURSA W/O US: CPT | Mod: LT,S$GLB,, | Performed by: NURSE PRACTITIONER

## 2021-10-18 PROCEDURE — 3008F BODY MASS INDEX DOCD: CPT | Mod: CPTII,S$GLB,, | Performed by: NURSE PRACTITIONER

## 2021-10-18 PROCEDURE — 99999 PR PBB SHADOW E&M-EST. PATIENT-LVL III: ICD-10-PCS | Mod: PBBFAC,,, | Performed by: NURSE PRACTITIONER

## 2021-10-18 PROCEDURE — 3008F PR BODY MASS INDEX (BMI) DOCUMENTED: ICD-10-PCS | Mod: CPTII,S$GLB,, | Performed by: NURSE PRACTITIONER

## 2021-10-18 PROCEDURE — 1160F RVW MEDS BY RX/DR IN RCRD: CPT | Mod: CPTII,S$GLB,, | Performed by: NURSE PRACTITIONER

## 2021-10-18 PROCEDURE — 99999 PR PBB SHADOW E&M-EST. PATIENT-LVL III: CPT | Mod: PBBFAC,,, | Performed by: NURSE PRACTITIONER

## 2021-10-18 PROCEDURE — 1125F AMNT PAIN NOTED PAIN PRSNT: CPT | Mod: CPTII,S$GLB,, | Performed by: NURSE PRACTITIONER

## 2021-10-18 PROCEDURE — 1159F MED LIST DOCD IN RCRD: CPT | Mod: CPTII,S$GLB,, | Performed by: NURSE PRACTITIONER

## 2021-10-18 PROCEDURE — 20610 PR DRAIN/INJECT LARGE JOINT/BURSA: ICD-10-PCS | Mod: LT,S$GLB,, | Performed by: NURSE PRACTITIONER

## 2021-10-18 PROCEDURE — 99203 PR OFFICE/OUTPT VISIT, NEW, LEVL III, 30-44 MIN: ICD-10-PCS | Mod: 25,S$GLB,, | Performed by: NURSE PRACTITIONER

## 2021-10-18 PROCEDURE — 1125F PR PAIN SEVERITY QUANTIFIED, PAIN PRESENT: ICD-10-PCS | Mod: CPTII,S$GLB,, | Performed by: NURSE PRACTITIONER

## 2021-10-18 PROCEDURE — 1159F PR MEDICATION LIST DOCUMENTED IN MEDICAL RECORD: ICD-10-PCS | Mod: CPTII,S$GLB,, | Performed by: NURSE PRACTITIONER

## 2021-10-18 PROCEDURE — 99203 OFFICE O/P NEW LOW 30 MIN: CPT | Mod: 25,S$GLB,, | Performed by: NURSE PRACTITIONER

## 2021-10-18 PROCEDURE — 1160F PR REVIEW ALL MEDS BY PRESCRIBER/CLIN PHARMACIST DOCUMENTED: ICD-10-PCS | Mod: CPTII,S$GLB,, | Performed by: NURSE PRACTITIONER

## 2021-10-22 ENCOUNTER — HOSPITAL ENCOUNTER (OUTPATIENT)
Dept: RADIOLOGY | Facility: HOSPITAL | Age: 76
Discharge: HOME | End: 2021-10-22
Attending: PHYSICIAN ASSISTANT
Payer: MEDICARE

## 2021-10-22 DIAGNOSIS — M43.27 FUSION OF SPINE, LUMBOSACRAL REGION: ICD-10-CM

## 2021-10-22 DIAGNOSIS — R20.0 LEFT LEG NUMBNESS: ICD-10-CM

## 2021-10-22 DIAGNOSIS — M54.9 DORSALGIA, UNSPECIFIED: ICD-10-CM

## 2021-10-22 PROCEDURE — 72148 MRI LUMBAR SPINE W/O DYE: CPT | Mod: TC

## 2021-11-01 ENCOUNTER — OFFICE VISIT (OUTPATIENT)
Dept: ORTHOPEDICS | Facility: CLINIC | Age: 76
End: 2021-11-01
Payer: MEDICARE

## 2021-11-01 VITALS
TEMPERATURE: 96 F | HEART RATE: 82 BPM | HEIGHT: 68 IN | BODY MASS INDEX: 33.04 KG/M2 | OXYGEN SATURATION: 98 % | DIASTOLIC BLOOD PRESSURE: 72 MMHG | WEIGHT: 218 LBS | SYSTOLIC BLOOD PRESSURE: 140 MMHG

## 2021-11-01 DIAGNOSIS — M25.512 ACUTE PAIN OF LEFT SHOULDER: Primary | ICD-10-CM

## 2021-11-01 DIAGNOSIS — M19.012 ARTHRITIS OF SHOULDER REGION, LEFT: ICD-10-CM

## 2021-11-01 PROCEDURE — 1125F AMNT PAIN NOTED PAIN PRSNT: CPT | Mod: CPTII,S$GLB,, | Performed by: NURSE PRACTITIONER

## 2021-11-01 PROCEDURE — 1159F MED LIST DOCD IN RCRD: CPT | Mod: CPTII,S$GLB,, | Performed by: NURSE PRACTITIONER

## 2021-11-01 PROCEDURE — 1160F RVW MEDS BY RX/DR IN RCRD: CPT | Mod: CPTII,S$GLB,, | Performed by: NURSE PRACTITIONER

## 2021-11-01 PROCEDURE — 99999 PR PBB SHADOW E&M-EST. PATIENT-LVL III: ICD-10-PCS | Mod: PBBFAC,,, | Performed by: NURSE PRACTITIONER

## 2021-11-01 PROCEDURE — 3077F PR MOST RECENT SYSTOLIC BLOOD PRESSURE >= 140 MM HG: ICD-10-PCS | Mod: CPTII,S$GLB,, | Performed by: NURSE PRACTITIONER

## 2021-11-01 PROCEDURE — 3008F PR BODY MASS INDEX (BMI) DOCUMENTED: ICD-10-PCS | Mod: CPTII,S$GLB,, | Performed by: NURSE PRACTITIONER

## 2021-11-01 PROCEDURE — 1125F PR PAIN SEVERITY QUANTIFIED, PAIN PRESENT: ICD-10-PCS | Mod: CPTII,S$GLB,, | Performed by: NURSE PRACTITIONER

## 2021-11-01 PROCEDURE — 1159F PR MEDICATION LIST DOCUMENTED IN MEDICAL RECORD: ICD-10-PCS | Mod: CPTII,S$GLB,, | Performed by: NURSE PRACTITIONER

## 2021-11-01 PROCEDURE — 3008F BODY MASS INDEX DOCD: CPT | Mod: CPTII,S$GLB,, | Performed by: NURSE PRACTITIONER

## 2021-11-01 PROCEDURE — 3078F DIAST BP <80 MM HG: CPT | Mod: CPTII,S$GLB,, | Performed by: NURSE PRACTITIONER

## 2021-11-01 PROCEDURE — 3077F SYST BP >= 140 MM HG: CPT | Mod: CPTII,S$GLB,, | Performed by: NURSE PRACTITIONER

## 2021-11-01 PROCEDURE — 99999 PR PBB SHADOW E&M-EST. PATIENT-LVL III: CPT | Mod: PBBFAC,,, | Performed by: NURSE PRACTITIONER

## 2021-11-01 PROCEDURE — 1160F PR REVIEW ALL MEDS BY PRESCRIBER/CLIN PHARMACIST DOCUMENTED: ICD-10-PCS | Mod: CPTII,S$GLB,, | Performed by: NURSE PRACTITIONER

## 2021-11-01 PROCEDURE — 3078F PR MOST RECENT DIASTOLIC BLOOD PRESSURE < 80 MM HG: ICD-10-PCS | Mod: CPTII,S$GLB,, | Performed by: NURSE PRACTITIONER

## 2021-11-01 PROCEDURE — 99213 PR OFFICE/OUTPT VISIT, EST, LEVL III, 20-29 MIN: ICD-10-PCS | Mod: S$GLB,,, | Performed by: NURSE PRACTITIONER

## 2021-11-01 PROCEDURE — 99213 OFFICE O/P EST LOW 20 MIN: CPT | Mod: S$GLB,,, | Performed by: NURSE PRACTITIONER

## 2021-11-04 ENCOUNTER — HOSPITAL ENCOUNTER (OUTPATIENT)
Dept: RADIOLOGY | Facility: HOSPITAL | Age: 76
Discharge: HOME | End: 2021-11-04
Attending: NURSE PRACTITIONER
Payer: MEDICARE

## 2021-11-04 DIAGNOSIS — M25.512 ACUTE PAIN OF LEFT SHOULDER: ICD-10-CM

## 2021-11-04 PROCEDURE — 73221 MRI JOINT UPR EXTREM W/O DYE: CPT | Mod: TC,LT

## 2021-11-05 ENCOUNTER — OFFICE VISIT (OUTPATIENT)
Dept: ORTHOPEDICS | Facility: CLINIC | Age: 76
End: 2021-11-05
Payer: MEDICARE

## 2021-11-05 VITALS — OXYGEN SATURATION: 99 % | HEART RATE: 75 BPM | WEIGHT: 218 LBS | BODY MASS INDEX: 33.04 KG/M2 | HEIGHT: 68 IN

## 2021-11-05 DIAGNOSIS — M75.112 NONTRAUMATIC INCOMPLETE TEAR OF LEFT ROTATOR CUFF: ICD-10-CM

## 2021-11-05 DIAGNOSIS — M19.012 ARTHRITIS OF SHOULDER REGION, LEFT: Primary | ICD-10-CM

## 2021-11-05 PROCEDURE — 1159F MED LIST DOCD IN RCRD: CPT | Mod: CPTII,S$GLB,, | Performed by: NURSE PRACTITIONER

## 2021-11-05 PROCEDURE — 99999 PR PBB SHADOW E&M-EST. PATIENT-LVL III: CPT | Mod: PBBFAC,,, | Performed by: NURSE PRACTITIONER

## 2021-11-05 PROCEDURE — 99213 OFFICE O/P EST LOW 20 MIN: CPT | Mod: S$GLB,,, | Performed by: NURSE PRACTITIONER

## 2021-11-05 PROCEDURE — 1160F PR REVIEW ALL MEDS BY PRESCRIBER/CLIN PHARMACIST DOCUMENTED: ICD-10-PCS | Mod: CPTII,S$GLB,, | Performed by: NURSE PRACTITIONER

## 2021-11-05 PROCEDURE — 99999 PR PBB SHADOW E&M-EST. PATIENT-LVL III: ICD-10-PCS | Mod: PBBFAC,,, | Performed by: NURSE PRACTITIONER

## 2021-11-05 PROCEDURE — 99213 PR OFFICE/OUTPT VISIT, EST, LEVL III, 20-29 MIN: ICD-10-PCS | Mod: S$GLB,,, | Performed by: NURSE PRACTITIONER

## 2021-11-05 PROCEDURE — 3008F BODY MASS INDEX DOCD: CPT | Mod: CPTII,S$GLB,, | Performed by: NURSE PRACTITIONER

## 2021-11-05 PROCEDURE — 3008F PR BODY MASS INDEX (BMI) DOCUMENTED: ICD-10-PCS | Mod: CPTII,S$GLB,, | Performed by: NURSE PRACTITIONER

## 2021-11-05 PROCEDURE — 1159F PR MEDICATION LIST DOCUMENTED IN MEDICAL RECORD: ICD-10-PCS | Mod: CPTII,S$GLB,, | Performed by: NURSE PRACTITIONER

## 2021-11-05 PROCEDURE — 1160F RVW MEDS BY RX/DR IN RCRD: CPT | Mod: CPTII,S$GLB,, | Performed by: NURSE PRACTITIONER

## 2022-04-21 ENCOUNTER — LAB VISIT (OUTPATIENT)
Dept: LAB | Facility: HOSPITAL | Age: 77
End: 2022-04-21
Attending: INTERNAL MEDICINE
Payer: MEDICARE

## 2022-04-21 DIAGNOSIS — I25.10 CORONARY ATHEROSCLEROSIS OF NATIVE CORONARY ARTERY: Primary | ICD-10-CM

## 2022-04-21 LAB
ANION GAP SERPL CALC-SCNC: 7 MMOL/L (ref 8–16)
BUN SERPL-MCNC: 28 MG/DL (ref 8–23)
CALCIUM SERPL-MCNC: 9.4 MG/DL (ref 8.7–10.5)
CHLORIDE SERPL-SCNC: 105 MMOL/L (ref 95–110)
CO2 SERPL-SCNC: 26 MMOL/L (ref 23–29)
CREAT SERPL-MCNC: 1.5 MG/DL (ref 0.5–1.4)
EST. GFR  (AFRICAN AMERICAN): 52.3 ML/MIN/1.73 M^2
EST. GFR  (NON AFRICAN AMERICAN): 45.2 ML/MIN/1.73 M^2
GLUCOSE SERPL-MCNC: 101 MG/DL (ref 70–110)
POTASSIUM SERPL-SCNC: 4.8 MMOL/L (ref 3.5–5.1)
SODIUM SERPL-SCNC: 138 MMOL/L (ref 136–145)

## 2022-04-21 PROCEDURE — 80048 BASIC METABOLIC PNL TOTAL CA: CPT | Performed by: INTERNAL MEDICINE

## 2022-04-21 PROCEDURE — 36415 COLL VENOUS BLD VENIPUNCTURE: CPT | Performed by: INTERNAL MEDICINE

## 2022-05-28 ENCOUNTER — LAB VISIT (OUTPATIENT)
Dept: LAB | Facility: HOSPITAL | Age: 77
End: 2022-05-28
Attending: INTERNAL MEDICINE
Payer: MEDICARE

## 2022-05-28 DIAGNOSIS — N19 RENAL FAILURE: Primary | ICD-10-CM

## 2022-05-28 LAB
ANION GAP SERPL CALC-SCNC: 5 MMOL/L (ref 8–16)
BACTERIA #/AREA URNS HPF: NEGATIVE /HPF
BILIRUB UR QL STRIP: NEGATIVE
BUN SERPL-MCNC: 19 MG/DL (ref 8–23)
CALCIUM SERPL-MCNC: 8.8 MG/DL (ref 8.7–10.5)
CHLORIDE SERPL-SCNC: 107 MMOL/L (ref 95–110)
CLARITY UR: CLEAR
CO2 SERPL-SCNC: 28 MMOL/L (ref 23–29)
COLOR UR: YELLOW
COMPLEXED PSA SERPL-MCNC: 8.4 NG/ML (ref 0–4)
CREAT SERPL-MCNC: 1.5 MG/DL (ref 0.5–1.4)
EST. GFR  (AFRICAN AMERICAN): 52.3 ML/MIN/1.73 M^2
EST. GFR  (NON AFRICAN AMERICAN): 45.2 ML/MIN/1.73 M^2
GLUCOSE SERPL-MCNC: 98 MG/DL (ref 70–110)
GLUCOSE UR QL STRIP: NEGATIVE
HGB UR QL STRIP: NEGATIVE
HYALINE CASTS #/AREA URNS LPF: 1.2 /LPF
KETONES UR QL STRIP: NEGATIVE
LEUKOCYTE ESTERASE UR QL STRIP: ABNORMAL
MICROSCOPIC COMMENT: ABNORMAL
NITRITE UR QL STRIP: NEGATIVE
PH UR STRIP: 6 [PH] (ref 5–8)
POTASSIUM SERPL-SCNC: 4.8 MMOL/L (ref 3.5–5.1)
PROT UR QL STRIP: ABNORMAL
RBC #/AREA URNS HPF: 2 /HPF (ref 0–4)
SODIUM SERPL-SCNC: 140 MMOL/L (ref 136–145)
SP GR UR STRIP: 1.02 (ref 1–1.03)
SQUAMOUS #/AREA URNS HPF: 1 /HPF
URN SPEC COLLECT METH UR: ABNORMAL
UROBILINOGEN UR STRIP-ACNC: 1 EU/DL
WBC #/AREA URNS HPF: 4 /HPF (ref 0–5)

## 2022-05-28 PROCEDURE — 84153 ASSAY OF PSA TOTAL: CPT | Mod: GA | Performed by: INTERNAL MEDICINE

## 2022-05-28 PROCEDURE — 81000 URINALYSIS NONAUTO W/SCOPE: CPT | Performed by: INTERNAL MEDICINE

## 2022-05-28 PROCEDURE — 36415 COLL VENOUS BLD VENIPUNCTURE: CPT | Performed by: INTERNAL MEDICINE

## 2022-05-28 PROCEDURE — 80048 BASIC METABOLIC PNL TOTAL CA: CPT | Performed by: INTERNAL MEDICINE

## 2022-07-10 ENCOUNTER — LAB VISIT (OUTPATIENT)
Dept: LAB | Facility: HOSPITAL | Age: 77
End: 2022-07-10
Attending: SPECIALIST
Payer: MEDICARE

## 2022-07-10 DIAGNOSIS — R97.20 ELEVATED PROSTATE SPECIFIC ANTIGEN (PSA): Primary | ICD-10-CM

## 2022-07-10 LAB — COMPLEXED PSA SERPL-MCNC: 3.5 NG/ML (ref 0–4)

## 2022-07-10 PROCEDURE — 36415 COLL VENOUS BLD VENIPUNCTURE: CPT | Performed by: SPECIALIST

## 2022-07-10 PROCEDURE — 84153 ASSAY OF PSA TOTAL: CPT | Performed by: SPECIALIST

## 2022-07-28 ENCOUNTER — HOSPITAL ENCOUNTER (OUTPATIENT)
Dept: RADIOLOGY | Facility: HOSPITAL | Age: 77
Discharge: HOME | End: 2022-07-28
Attending: ANESTHESIOLOGY
Payer: MEDICARE

## 2022-07-28 DIAGNOSIS — M47.816 SPONDYLOSIS OF LUMBAR SPINE: ICD-10-CM

## 2022-07-28 DIAGNOSIS — M96.1 POSTLAMINECTOMY SYNDROME OF LUMBAR REGION: ICD-10-CM

## 2022-07-28 DIAGNOSIS — M54.16 LUMBAR RADICULOPATHY: ICD-10-CM

## 2022-07-28 PROCEDURE — 25500020 PHARM REV CODE 255

## 2022-07-28 PROCEDURE — A9585 GADOBUTROL INJECTION: HCPCS

## 2022-07-28 PROCEDURE — 72158 MRI LUMBAR SPINE W/O & W/DYE: CPT | Mod: TC

## 2022-07-28 RX ADMIN — GADOBUTROL 10 ML: 604.72 INJECTION INTRAVENOUS at 02:07

## 2022-08-09 ENCOUNTER — APPOINTMENT (OUTPATIENT)
Dept: LAB | Facility: HOSPITAL | Age: 77
End: 2022-08-09
Attending: INTERNAL MEDICINE
Payer: MEDICARE

## 2022-08-09 DIAGNOSIS — R09.81 SINUS CONGESTION: Primary | ICD-10-CM

## 2022-08-09 LAB — SARS-COV-2 RNA RESP QL NAA+PROBE: NOT DETECTED

## 2022-08-09 PROCEDURE — U0002 COVID-19 LAB TEST NON-CDC: HCPCS | Performed by: INTERNAL MEDICINE

## 2022-10-25 ENCOUNTER — LAB VISIT (OUTPATIENT)
Dept: LAB | Facility: HOSPITAL | Age: 77
End: 2022-10-25
Attending: INTERNAL MEDICINE
Payer: MEDICARE

## 2022-10-25 DIAGNOSIS — H69.90 ETD (EUSTACHIAN TUBE DYSFUNCTION): ICD-10-CM

## 2022-10-25 DIAGNOSIS — J06.9 URI (UPPER RESPIRATORY INFECTION): Primary | ICD-10-CM

## 2022-10-25 DIAGNOSIS — R35.89 POLYURIA: ICD-10-CM

## 2022-10-25 LAB
BACTERIA #/AREA URNS HPF: NEGATIVE /HPF
BILIRUB UR QL STRIP: NEGATIVE
CLARITY UR: CLEAR
COLOR UR: YELLOW
ERYTHROCYTE [DISTWIDTH] IN BLOOD BY AUTOMATED COUNT: 13.1 % (ref 11.5–14.5)
GLUCOSE UR QL STRIP: NEGATIVE
HCT VFR BLD AUTO: 50.6 % (ref 40–54)
HGB BLD-MCNC: 17.2 G/DL (ref 14–18)
HGB UR QL STRIP: NEGATIVE
HYALINE CASTS #/AREA URNS LPF: 2.7 /LPF
KETONES UR QL STRIP: ABNORMAL
LEUKOCYTE ESTERASE UR QL STRIP: ABNORMAL
MCH RBC QN AUTO: 32 PG (ref 27–31)
MCHC RBC AUTO-ENTMCNC: 34 G/DL (ref 32–36)
MCV RBC AUTO: 94 FL (ref 82–98)
MICROSCOPIC COMMENT: ABNORMAL
NITRITE UR QL STRIP: NEGATIVE
PH UR STRIP: 6 [PH] (ref 5–8)
PLATELET # BLD AUTO: 221 K/UL (ref 150–450)
PMV BLD AUTO: 9.4 FL (ref 9.2–12.9)
PROT UR QL STRIP: ABNORMAL
RBC # BLD AUTO: 5.37 M/UL (ref 4.6–6.2)
RBC #/AREA URNS HPF: 1 /HPF (ref 0–4)
SP GR UR STRIP: >=1.03 (ref 1–1.03)
SQUAMOUS #/AREA URNS HPF: 0 /HPF
URN SPEC COLLECT METH UR: ABNORMAL
UROBILINOGEN UR STRIP-ACNC: 1 EU/DL
WBC # BLD AUTO: 6.95 K/UL (ref 3.9–12.7)
WBC #/AREA URNS HPF: 5 /HPF (ref 0–5)

## 2022-10-25 PROCEDURE — 81000 URINALYSIS NONAUTO W/SCOPE: CPT | Performed by: INTERNAL MEDICINE

## 2022-10-25 PROCEDURE — 36415 COLL VENOUS BLD VENIPUNCTURE: CPT | Performed by: INTERNAL MEDICINE

## 2022-10-25 PROCEDURE — 85027 COMPLETE CBC AUTOMATED: CPT | Performed by: INTERNAL MEDICINE

## 2023-02-27 ENCOUNTER — LAB VISIT (OUTPATIENT)
Dept: LAB | Facility: HOSPITAL | Age: 78
End: 2023-02-27
Attending: INTERNAL MEDICINE
Payer: MEDICARE

## 2023-02-27 DIAGNOSIS — I95.9 HYPOTENSION: ICD-10-CM

## 2023-02-27 DIAGNOSIS — R53.83 FATIGUE: Primary | ICD-10-CM

## 2023-02-27 DIAGNOSIS — R42 VERTIGO: ICD-10-CM

## 2023-02-27 LAB
ALBUMIN SERPL BCP-MCNC: 3.9 G/DL (ref 3.5–5.2)
ALP SERPL-CCNC: 66 U/L (ref 55–135)
ALT SERPL W/O P-5'-P-CCNC: 21 U/L (ref 10–44)
ANION GAP SERPL CALC-SCNC: 2 MMOL/L (ref 8–16)
AST SERPL-CCNC: 17 U/L (ref 10–40)
BASOPHILS # BLD AUTO: 0.07 K/UL (ref 0–0.2)
BASOPHILS NFR BLD: 0.9 % (ref 0–1.9)
BILIRUB SERPL-MCNC: 0.6 MG/DL (ref 0.1–1)
BUN SERPL-MCNC: 16 MG/DL (ref 8–23)
CALCIUM SERPL-MCNC: 9.5 MG/DL (ref 8.7–10.5)
CHLORIDE SERPL-SCNC: 110 MMOL/L (ref 95–110)
CO2 SERPL-SCNC: 27 MMOL/L (ref 23–29)
CREAT SERPL-MCNC: 1.5 MG/DL (ref 0.5–1.4)
DIFFERENTIAL METHOD: ABNORMAL
EOSINOPHIL # BLD AUTO: 0.3 K/UL (ref 0–0.5)
EOSINOPHIL NFR BLD: 3.6 % (ref 0–8)
ERYTHROCYTE [DISTWIDTH] IN BLOOD BY AUTOMATED COUNT: 12.3 % (ref 11.5–14.5)
EST. GFR  (NO RACE VARIABLE): 48.2 ML/MIN/1.73 M^2
GLUCOSE SERPL-MCNC: 103 MG/DL (ref 70–110)
HCT VFR BLD AUTO: 48.6 % (ref 40–54)
HGB BLD-MCNC: 17.1 G/DL (ref 14–18)
IMM GRANULOCYTES # BLD AUTO: 0.03 K/UL (ref 0–0.04)
IMM GRANULOCYTES NFR BLD AUTO: 0.4 % (ref 0–0.5)
LYMPHOCYTES # BLD AUTO: 1.3 K/UL (ref 1–4.8)
LYMPHOCYTES NFR BLD: 16.2 % (ref 18–48)
MCH RBC QN AUTO: 32.3 PG (ref 27–31)
MCHC RBC AUTO-ENTMCNC: 35.2 G/DL (ref 32–36)
MCV RBC AUTO: 92 FL (ref 82–98)
MONOCYTES # BLD AUTO: 0.7 K/UL (ref 0.3–1)
MONOCYTES NFR BLD: 8 % (ref 4–15)
NEUTROPHILS # BLD AUTO: 5.8 K/UL (ref 1.8–7.7)
NEUTROPHILS NFR BLD: 70.9 % (ref 38–73)
NRBC BLD-RTO: 0 /100 WBC
PLATELET # BLD AUTO: 220 K/UL (ref 150–450)
PMV BLD AUTO: 9.7 FL (ref 9.2–12.9)
POTASSIUM SERPL-SCNC: 5.3 MMOL/L (ref 3.5–5.1)
PROT SERPL-MCNC: 7 G/DL (ref 6–8.4)
RBC # BLD AUTO: 5.29 M/UL (ref 4.6–6.2)
SODIUM SERPL-SCNC: 139 MMOL/L (ref 136–145)
TSH SERPL DL<=0.005 MIU/L-ACNC: 0.62 UIU/ML (ref 0.4–4)
WBC # BLD AUTO: 8.16 K/UL (ref 3.9–12.7)

## 2023-02-27 PROCEDURE — 85025 COMPLETE CBC W/AUTO DIFF WBC: CPT | Performed by: INTERNAL MEDICINE

## 2023-02-27 PROCEDURE — 84403 ASSAY OF TOTAL TESTOSTERONE: CPT | Performed by: INTERNAL MEDICINE

## 2023-02-27 PROCEDURE — 80053 COMPREHEN METABOLIC PANEL: CPT | Performed by: INTERNAL MEDICINE

## 2023-02-27 PROCEDURE — 36415 COLL VENOUS BLD VENIPUNCTURE: CPT | Performed by: INTERNAL MEDICINE

## 2023-02-27 PROCEDURE — 84443 ASSAY THYROID STIM HORMONE: CPT | Performed by: INTERNAL MEDICINE

## 2023-02-28 LAB — TESTOST SERPL-MCNC: 662 NG/DL (ref 304–1227)

## 2023-07-25 ENCOUNTER — HOSPITAL ENCOUNTER (EMERGENCY)
Facility: HOSPITAL | Age: 78
Discharge: HOME | End: 2023-07-25
Attending: STUDENT IN AN ORGANIZED HEALTH CARE EDUCATION/TRAINING PROGRAM
Payer: MEDICARE

## 2023-07-25 VITALS
OXYGEN SATURATION: 99 % | SYSTOLIC BLOOD PRESSURE: 149 MMHG | HEIGHT: 67 IN | DIASTOLIC BLOOD PRESSURE: 71 MMHG | TEMPERATURE: 98 F | HEART RATE: 82 BPM | RESPIRATION RATE: 16 BRPM | WEIGHT: 223 LBS | BODY MASS INDEX: 35 KG/M2

## 2023-07-25 DIAGNOSIS — T14.90XA INJURY: ICD-10-CM

## 2023-07-25 DIAGNOSIS — S93.601A SPRAIN OF RIGHT FOOT, INITIAL ENCOUNTER: Primary | ICD-10-CM

## 2023-07-25 PROCEDURE — 99283 EMERGENCY DEPT VISIT LOW MDM: CPT

## 2023-07-25 RX ORDER — SENNOSIDES 8.6 MG/1
TABLET ORAL
COMMUNITY
Start: 2022-09-29

## 2023-07-25 RX ORDER — ROSUVASTATIN CALCIUM 40 MG/1
TABLET, COATED ORAL
COMMUNITY
Start: 2022-09-29

## 2023-07-25 RX ORDER — PREGABALIN 50 MG/1
CAPSULE ORAL
COMMUNITY
Start: 2022-09-29

## 2023-07-25 RX ORDER — METOPROLOL SUCCINATE 50 MG/1
TABLET, EXTENDED RELEASE ORAL
COMMUNITY
Start: 2022-09-29

## 2023-07-26 NOTE — DISCHARGE INSTRUCTIONS
Continue home medications as directed.  You may want to elevate your foot and apply ice to help reduce pain.  Follow-up with your primary doctor in 1-2 days and return to the emergency department for worsening condition.

## 2023-07-26 NOTE — ED PROVIDER NOTES
"Encounter Date: 7/25/2023       History     Chief Complaint   Patient presents with    Foot Injury     Pt presents to the ER w/ complaints of R foot pain. Pt states he "tripped on a curb a few days ago," reports pain to R foot.     This is a 77-year-old white male with a history of hypertension who presents to the emergency department with complaints of right foot pain after sustaining an injury 3 days ago.  He reports "missing the curb" in injuring the lateral aspect of the right foot.  States pain is worse with ambulation and touching the area.  He denies swelling, discoloration, or previous right foot injury.  Pain is unrelieved with prescribed hydrocodone.    Review of patient's allergies indicates:  No Known Allergies  Past Medical History:   Diagnosis Date    Cerebral aneurysm     GERD (gastroesophageal reflux disease)     Hyperlipidemia     Hypertension      Past Surgical History:   Procedure Laterality Date    CEREBRAL ANEURYSM REPAIR      cervical spinal surgery      CHOLECYSTECTOMY      EPIDURAL STEROID INJECTION N/A 12/4/2020    Procedure: INJECTION, STEROID, EPIDURAL C7-T1;  Surgeon: Rustam Donahue MD;  Location: [facility];  Service: Pain Management;  Laterality: N/A;  INJECTION, STEROID, EPIDURAL C7/T1    INJECTION OF JOINT N/A 11/10/2020    Procedure: INJECTION, JOINT CAUDAL;  Surgeon: Rustam Donahue MD;  Location: [facility];  Service: Pain Management;  Laterality: N/A;  CAUDAL ALISA    SHOULDER ARTHROSCOPY W/ ROTATOR CUFF REPAIR Right     SPINE SURGERY      x 3 lumbar    TOTAL HIP ARTHROPLASTY      right     Family History   Problem Relation Age of Onset    Lung cancer Father     Cancer Brother 45        agent orange    Cancer Sister 60        intestinal cancer     Social History     Tobacco Use    Smoking status: Former    Smokeless tobacco: Former   Substance Use Topics    Alcohol use: No    Drug use: No     Review of Systems   Musculoskeletal:  Positive for arthralgias and gait problem. "   Skin: Negative.    Neurological:  Negative for numbness.     Physical Exam     Initial Vitals [07/25/23 2059]   BP Pulse Resp Temp SpO2   (!) 149/71 82 16 97.9 °F (36.6 °C) 99 %      MAP       --         Physical Exam    Nursing note and vitals reviewed.  Constitutional: He appears well-developed and well-nourished. He is active. No distress.   HENT:   Head: Normocephalic and atraumatic.   Eyes: EOM are normal. Pupils are equal, round, and reactive to light.   Neck: Neck supple.   Normal range of motion.  Cardiovascular:  Normal rate.           Pulmonary/Chest: No respiratory distress.   Musculoskeletal:         General: Tenderness present.      Cervical back: Normal range of motion and neck supple.      Comments: The right foot appears normal upon inspection, no rash or discoloration noted, no swelling.  Patient does experience tenderness along the 5th metatarsal.  Dorsalis pedis pulses 3+.  Distally NVI.  Normal range of motion.  Patient is ambulatory with limp.     Neurological: He is alert and oriented to person, place, and time. GCS score is 15. GCS eye subscore is 4. GCS verbal subscore is 5. GCS motor subscore is 6.   Skin: Skin is warm and dry. Capillary refill takes less than 2 seconds.   Psychiatric: He has a normal mood and affect. His behavior is normal. Thought content normal.       ED Course   Procedures  Labs Reviewed - No data to display       Imaging Results              X-Ray Foot Complete Right (In process)                      Medications - No data to display              ED Course as of 07/25/23 2134 Tue Jul 25, 2023 2132 No acute findings on right foot x-ray.  Likely underlying sprain. [CB]      ED Course User Index  [CB] Kim Maldonado NP                 Clinical Impression:   Final diagnoses:  [T14.90XA] Injury  [S93.601A] Sprain of right foot, initial encounter (Primary)        ED Disposition Condition    Discharge Stable          ED Prescriptions    None       Follow-up  Information       Follow up With Specialties Details Why Contact Info    Liantmelvin Hoyt MD Internal Medicine Schedule an appointment as soon as possible for a visit in 2 days for re-evaluation of today's complaint [address] [address]  [address] [facility]  [facility] [address]  [phone]               Kim Maldonado NP  07/25/23 7713

## 2023-12-11 DIAGNOSIS — R06.00 DYSPNEA: Primary | ICD-10-CM

## 2023-12-12 ENCOUNTER — HOSPITAL ENCOUNTER (OUTPATIENT)
Dept: PULMONOLOGY | Facility: HOSPITAL | Age: 78
Discharge: HOME | End: 2023-12-12
Attending: INTERNAL MEDICINE
Payer: MEDICARE

## 2023-12-12 VITALS — OXYGEN SATURATION: 98 % | RESPIRATION RATE: 18 BRPM | HEART RATE: 75 BPM

## 2023-12-12 DIAGNOSIS — R06.00 DYSPNEA: ICD-10-CM

## 2023-12-12 PROCEDURE — 25000242 PHARM REV CODE 250 ALT 637 W/ HCPCS: Performed by: INTERNAL MEDICINE

## 2023-12-12 PROCEDURE — 94060 EVALUATION OF WHEEZING: CPT

## 2023-12-12 PROCEDURE — 99900035 HC TECH TIME PER 15 MIN (STAT)

## 2023-12-12 PROCEDURE — 94640 AIRWAY INHALATION TREATMENT: CPT | Mod: 59

## 2023-12-12 PROCEDURE — 94760 N-INVAS EAR/PLS OXIMETRY 1: CPT

## 2023-12-12 RX ORDER — ALBUTEROL SULFATE 2.5 MG/.5ML
2.5 SOLUTION RESPIRATORY (INHALATION) ONCE
Status: COMPLETED | OUTPATIENT
Start: 2023-12-12 | End: 2023-12-12

## 2023-12-12 RX ADMIN — ALBUTEROL SULFATE 2.5 MG: 2.5 SOLUTION RESPIRATORY (INHALATION) at 09:12

## 2023-12-13 LAB
FEF 25 75 LLN: 0.83
FEF 25 75 PRE REF: 58.8 %
FEF 25 75 REF: 2.08
FET100 CHG: -38.4 %
FEV05 LLN: 1.23
FEV05 REF: 2.36
FEV1 CHG: 11.7 %
FEV1 FVC LLN: 61
FEV1 FVC PRE REF: 88.5 %
FEV1 FVC REF: 75
FEV1 LLN: 1.99
FEV1 PRE REF: 95 %
FEV1 REF: 2.83
FEV1 VOL CHG: 0.31
FVC CHG: 2.2 %
FVC LLN: 2.75
FVC PRE REF: 106.7 %
FVC REF: 3.77
FVC VOL CHG: 0.09
PEF LLN: 5.1
PEF PRE REF: 94.2 %
PEF REF: 7.3
PHYSICIAN COMMENT: NORMAL
POST FEF 25 75: 2.2 L/S (ref 0.83–3.32)
POST FET 100: 8.34 SEC
POST FEV1 FVC: 73.02 % (ref 61.18–89.76)
POST FEV1: 3 L (ref 1.99–3.66)
POST FEV5: 2.3 L (ref 1.23–3.5)
POST FVC: 4.11 L (ref 2.75–4.79)
POST PEF: 6.69 L/S (ref 5.1–9.49)
PRE FEF 25 75: 1.22 L/S (ref 0.83–3.32)
PRE FET 100: 13.53 SEC
PRE FEV05 REF: 87.7 %
PRE FEV1 FVC: 66.78 % (ref 61.18–89.76)
PRE FEV1: 2.68 L (ref 1.99–3.66)
PRE FEV5: 2.07 L (ref 1.23–3.5)
PRE FVC: 4.02 L (ref 2.75–4.79)
PRE PEF: 6.87 L/S (ref 5.1–9.49)

## 2024-10-02 ENCOUNTER — TELEPHONE (OUTPATIENT)
Dept: ORTHOPEDICS | Facility: CLINIC | Age: 79
End: 2024-10-02

## 2024-10-02 DIAGNOSIS — Z96.649 STATUS POST HIP REPLACEMENT, UNSPECIFIED LATERALITY: Primary | ICD-10-CM

## 2024-11-06 ENCOUNTER — HOSPITAL ENCOUNTER (OUTPATIENT)
Dept: RADIOLOGY | Facility: HOSPITAL | Age: 79
Discharge: HOME | End: 2024-11-06
Attending: ORTHOPAEDIC SURGERY
Payer: MEDICARE

## 2024-11-06 DIAGNOSIS — Z96.649 STATUS POST HIP REPLACEMENT, UNSPECIFIED LATERALITY: ICD-10-CM

## 2024-11-06 PROCEDURE — 73521 X-RAY EXAM HIPS BI 2 VIEWS: CPT | Mod: TC

## (undated) DEVICE — DRESSING LEUKOPLAST FLEX 1X3IN